# Patient Record
Sex: FEMALE | Race: WHITE | Employment: UNEMPLOYED | URBAN - METROPOLITAN AREA
[De-identification: names, ages, dates, MRNs, and addresses within clinical notes are randomized per-mention and may not be internally consistent; named-entity substitution may affect disease eponyms.]

---

## 2018-04-11 ENCOUNTER — OFFICE VISIT (OUTPATIENT)
Dept: ENDOCRINOLOGY | Age: 48
End: 2018-04-11

## 2018-04-11 VITALS
SYSTOLIC BLOOD PRESSURE: 97 MMHG | WEIGHT: 123 LBS | DIASTOLIC BLOOD PRESSURE: 66 MMHG | HEIGHT: 66 IN | OXYGEN SATURATION: 99 % | HEART RATE: 85 BPM | BODY MASS INDEX: 19.77 KG/M2

## 2018-04-11 DIAGNOSIS — E03.9 ACQUIRED HYPOTHYROIDISM: ICD-10-CM

## 2018-04-11 PROCEDURE — 99204 OFFICE O/P NEW MOD 45 MIN: CPT | Performed by: INTERNAL MEDICINE

## 2018-04-11 RX ORDER — OMEPRAZOLE 40 MG/1
40 CAPSULE, DELAYED RELEASE ORAL DAILY
COMMUNITY

## 2018-04-11 RX ORDER — HYDROCHLOROTHIAZIDE 12.5 MG/1
CAPSULE, GELATIN COATED ORAL
COMMUNITY
Start: 2018-02-27

## 2018-04-11 RX ORDER — THYROID,PORK 97.5 MG
97.5 TABLET ORAL
Qty: 30 TABLET | Refills: 1
Start: 2018-04-11 | End: 2018-04-11 | Stop reason: SDUPTHER

## 2018-04-11 RX ORDER — MINOCYCLINE HYDROCHLORIDE 50 MG/1
CAPSULE ORAL
COMMUNITY
Start: 2018-01-31 | End: 2019-04-08 | Stop reason: ALTCHOICE

## 2018-04-11 RX ORDER — TOPIRAMATE 50 MG/1
150 TABLET, FILM COATED ORAL
COMMUNITY

## 2018-04-11 RX ORDER — SPIRONOLACTONE 100 MG/1
100 TABLET, FILM COATED ORAL 2 TIMES DAILY
COMMUNITY

## 2018-04-11 RX ORDER — THYROID,PORK 97.5 MG
97.5 TABLET ORAL
Qty: 30 TABLET | Refills: 3 | Status: SHIPPED | OUTPATIENT
Start: 2018-04-11 | End: 2018-08-20 | Stop reason: SDUPTHER

## 2018-04-11 ASSESSMENT — PATIENT HEALTH QUESTIONNAIRE - PHQ9
2. FEELING DOWN, DEPRESSED OR HOPELESS: 0
SUM OF ALL RESPONSES TO PHQ QUESTIONS 1-9: 0
1. LITTLE INTEREST OR PLEASURE IN DOING THINGS: 0
SUM OF ALL RESPONSES TO PHQ9 QUESTIONS 1 & 2: 0

## 2018-05-23 ENCOUNTER — TELEPHONE (OUTPATIENT)
Dept: ENDOCRINOLOGY | Age: 48
End: 2018-05-23

## 2018-06-12 ENCOUNTER — OFFICE VISIT (OUTPATIENT)
Dept: ENDOCRINOLOGY | Age: 48
End: 2018-06-12

## 2018-06-12 VITALS
HEART RATE: 81 BPM | WEIGHT: 124.4 LBS | HEIGHT: 66 IN | OXYGEN SATURATION: 98 % | DIASTOLIC BLOOD PRESSURE: 68 MMHG | SYSTOLIC BLOOD PRESSURE: 104 MMHG | BODY MASS INDEX: 19.99 KG/M2

## 2018-06-12 DIAGNOSIS — E03.9 ACQUIRED HYPOTHYROIDISM: Primary | ICD-10-CM

## 2018-06-12 PROCEDURE — G8420 CALC BMI NORM PARAMETERS: HCPCS | Performed by: INTERNAL MEDICINE

## 2018-06-12 PROCEDURE — 1036F TOBACCO NON-USER: CPT | Performed by: INTERNAL MEDICINE

## 2018-06-12 PROCEDURE — G8427 DOCREV CUR MEDS BY ELIG CLIN: HCPCS | Performed by: INTERNAL MEDICINE

## 2018-06-12 PROCEDURE — 99213 OFFICE O/P EST LOW 20 MIN: CPT | Performed by: INTERNAL MEDICINE

## 2018-06-12 RX ORDER — LEVOTHYROXINE SODIUM 112 UG/1
112 TABLET ORAL DAILY
Qty: 90 TABLET | Refills: 1 | Status: SHIPPED | OUTPATIENT
Start: 2018-06-12 | End: 2018-08-20 | Stop reason: SDUPTHER

## 2018-06-12 ASSESSMENT — PATIENT HEALTH QUESTIONNAIRE - PHQ9
SUM OF ALL RESPONSES TO PHQ QUESTIONS 1-9: 0
SUM OF ALL RESPONSES TO PHQ9 QUESTIONS 1 & 2: 0
2. FEELING DOWN, DEPRESSED OR HOPELESS: 0
1. LITTLE INTEREST OR PLEASURE IN DOING THINGS: 0

## 2018-08-16 ENCOUNTER — TELEPHONE (OUTPATIENT)
Dept: ENDOCRINOLOGY | Age: 48
End: 2018-08-16

## 2018-08-16 NOTE — TELEPHONE ENCOUNTER
LMOM for patient to have labs done before her appointment on Monday, and if she has already had the labs to call the office to let us know when and where they were done.

## 2018-08-20 ENCOUNTER — OFFICE VISIT (OUTPATIENT)
Dept: ENDOCRINOLOGY | Age: 48
End: 2018-08-20

## 2018-08-20 VITALS
SYSTOLIC BLOOD PRESSURE: 100 MMHG | DIASTOLIC BLOOD PRESSURE: 60 MMHG | HEART RATE: 85 BPM | WEIGHT: 121.8 LBS | HEIGHT: 66 IN | BODY MASS INDEX: 19.58 KG/M2 | OXYGEN SATURATION: 98 %

## 2018-08-20 DIAGNOSIS — E03.9 ACQUIRED HYPOTHYROIDISM: ICD-10-CM

## 2018-08-20 DIAGNOSIS — E03.9 ACQUIRED HYPOTHYROIDISM: Primary | ICD-10-CM

## 2018-08-20 PROCEDURE — 1036F TOBACCO NON-USER: CPT | Performed by: INTERNAL MEDICINE

## 2018-08-20 PROCEDURE — G8420 CALC BMI NORM PARAMETERS: HCPCS | Performed by: INTERNAL MEDICINE

## 2018-08-20 PROCEDURE — 99213 OFFICE O/P EST LOW 20 MIN: CPT | Performed by: INTERNAL MEDICINE

## 2018-08-20 PROCEDURE — G8427 DOCREV CUR MEDS BY ELIG CLIN: HCPCS | Performed by: INTERNAL MEDICINE

## 2018-08-20 RX ORDER — LIOTHYRONINE SODIUM 5 UG/1
5 TABLET ORAL DAILY
Qty: 30 TABLET | Refills: 3 | Status: SHIPPED | OUTPATIENT
Start: 2018-08-20 | End: 2018-08-20 | Stop reason: SDUPTHER

## 2018-08-20 RX ORDER — LEVOTHYROXINE SODIUM 88 UG/1
88 TABLET ORAL DAILY
Qty: 90 TABLET | Refills: 1 | Status: SHIPPED | OUTPATIENT
Start: 2018-08-20 | End: 2018-11-14 | Stop reason: SDUPTHER

## 2018-08-20 RX ORDER — LEVOTHYROXINE SODIUM 88 UG/1
88 TABLET ORAL DAILY
Qty: 30 TABLET | Refills: 3 | Status: SHIPPED | OUTPATIENT
Start: 2018-08-20 | End: 2018-08-20 | Stop reason: SDUPTHER

## 2018-08-20 RX ORDER — LIOTHYRONINE SODIUM 5 UG/1
5 TABLET ORAL DAILY
Qty: 90 TABLET | Refills: 1 | Status: SHIPPED | OUTPATIENT
Start: 2018-08-20 | End: 2018-11-14 | Stop reason: SDUPTHER

## 2018-08-20 RX ORDER — DROSPIRENONE AND ETHINYL ESTRADIOL 0.02-3(28)
KIT ORAL
COMMUNITY
Start: 2018-07-23

## 2018-08-20 ASSESSMENT — PATIENT HEALTH QUESTIONNAIRE - PHQ9
SUM OF ALL RESPONSES TO PHQ9 QUESTIONS 1 & 2: 0
1. LITTLE INTEREST OR PLEASURE IN DOING THINGS: 0
2. FEELING DOWN, DEPRESSED OR HOPELESS: 0
SUM OF ALL RESPONSES TO PHQ QUESTIONS 1-9: 0
SUM OF ALL RESPONSES TO PHQ QUESTIONS 1-9: 0

## 2018-08-20 NOTE — PROGRESS NOTES
muscle pain,has bone pain  Integument/Breast: has hair loss, noskin rashes, no skin lesions, has itching, has dry skin, no breast pain, no breast mass, has skin hives, has skin discoloration, no nipple discharge  Neurological: no numbness, has tingling, no weakness, no confusion, no headaches, has dizziness, no fainting, no tremors, no decrease in memory, no balance problems  Psychiatric: no anxiety, no depression, has insomnia  Hematologic/Lymphatic: no tendency for easy bleeding, no swollen lymph nodes, has tendency for easy bruising  Immunology: has seasonal allergies, no frequent infections, no frequent illnesses  Endocrine: has temperature intolerance, no hot flashes, no hand tremor    OBJECTIVE:   /60 (Site: Left Arm, Position: Sitting, Cuff Size: Small Adult)   Pulse 85   Ht 5' 6\" (1.676 m)   Wt 121 lb 12.8 oz (55.2 kg)   SpO2 98%   BMI 19.66 kg/m²   Wt Readings from Last 3 Encounters:   08/20/18 121 lb 12.8 oz (55.2 kg)   06/12/18 124 lb 6.4 oz (56.4 kg)   04/11/18 123 lb (55.8 kg)       Physical Exam:  Constitutional: no acute distress, well appearing, well nourished  Psychiatric: oriented to person, place and time, judgement, insight and normal, recent and remote memory and intact and mood, affect are normal  Skin: skin and subcutaneous tissue is normal without mass, normal turgor  Head and Face: examination of head and face revealed no abnormalities  Eyes: no lid or conjunctival swelling, no erythema or discharge, pupils are normal, equal, round, and reactive to light  Ears/Nose: external inspection of ears and nose revealed no abnormalities, hearing is grossly normal  Oropharynx/Mouth/Face: lips, tongue and gums are normal with no lesions, the voice quality was normal  Neck: neck is supple and symmetric, with midline trachea and no masses, thyroid is small  Lymphatics: normal cervical lymph nodes, normal supraclavicular nodes  Pulmonary: no increased work of breathing or signs of respiratory distress, lungs are clear to auscultation  Cardiovascular: normal heart rate and rhythm, normal S1 and S2, no murmurs and pedal pulses and 2+ bilaterally, No edema  Abdomen: abdomen is soft, non-tender with no masses  Musculoskeletal: normal gait and station, exam of the digits and nails are normal  Neurological: normal coordination, normal general cortical function    Lab Review:  No results found for: TSH  No results found for: FREET4      ASSESSMENT/PLAN:  1. Acquired hypothyroidism  Decrease Levothyroxine to 0.088 mg, add liothyronine 5 mcg.  - T4, Free; Future  - TSH without Reflex; Future  - T3, Free; Future  - T3, Reverse; Future  Consider to add liothyronine. Reviewed and/or ordered clinical lab results Yes  Reviewed and/or ordered radiology tests Yes   Reviewed and/or ordered other diagnostic tests No  Discussed test results with performing physician No  Independently reviewed image, tracing, or specimen No  Made a decision to obtain old records No  Reviewed old records Yes  Obtained history from other than patient No    Luisa Falcon was counseled regarding symptoms of thyroid diagnosis, side effects of medications, treatment options,labs, imaging, treatment targets and goals, levothyroxine, liothyronine. She understands instructions and counseling. Total visit time 15 min, >50 % was spent in counseling. Return in about 3 months (around 11/20/2018) for thyroid problems.

## 2018-08-24 ENCOUNTER — TELEPHONE (OUTPATIENT)
Dept: ENDOCRINOLOGY | Age: 48
End: 2018-08-24

## 2018-08-28 NOTE — TELEPHONE ENCOUNTER
Pt was returning Madeline/Dr. Fernandez's call. Pt thinks that she is leveling out and she wants to give it a few more days to see what happens. Pt wants to leave it how it is and she'll give us call back to decide if she still wants to increase the dose or not.

## 2018-09-06 ENCOUNTER — TELEPHONE (OUTPATIENT)
Dept: ENDOCRINOLOGY | Age: 48
End: 2018-09-06

## 2018-09-06 NOTE — TELEPHONE ENCOUNTER
Spoke with the patient and advised to stop the Cytomel and and 88 mcg levothyroxine. Patient was advised to restart the 112 mcg levothyroxine and if the swelling gets any worse to immediatly go to the emergency room.  Patient stated her understanding

## 2018-09-06 NOTE — TELEPHONE ENCOUNTER
Pt called said she has leg, face and hand swelling was taken from 112- 88 levothyroxin with T3 added and has extreme fatigue would like a call bck from Dr. Jefferson Villafana

## 2018-11-14 DIAGNOSIS — E03.9 ACQUIRED HYPOTHYROIDISM: ICD-10-CM

## 2018-11-14 RX ORDER — LEVOTHYROXINE SODIUM 88 UG/1
88 TABLET ORAL DAILY
Qty: 90 TABLET | Refills: 1 | Status: SHIPPED | OUTPATIENT
Start: 2018-11-14 | End: 2018-11-21 | Stop reason: DRUGHIGH

## 2018-11-14 RX ORDER — LIOTHYRONINE SODIUM 5 UG/1
5 TABLET ORAL DAILY
Qty: 90 TABLET | Refills: 1 | Status: SHIPPED | OUTPATIENT
Start: 2018-11-14 | End: 2018-11-21 | Stop reason: ALTCHOICE

## 2018-11-21 RX ORDER — LEVOTHYROXINE SODIUM 112 UG/1
112 TABLET ORAL DAILY
Qty: 30 TABLET | Refills: 3 | Status: SHIPPED | OUTPATIENT
Start: 2018-11-21 | End: 2019-01-03 | Stop reason: CLARIF

## 2018-11-21 NOTE — TELEPHONE ENCOUNTER
Ms. Beto Acuna called to let Dr. Katlin Jimenez  know when she went to  her RX for Levothyroxine  it was for the wrong dose. Dose was changed in Sept back to 112 mcg. Please send new RX with correct dose.

## 2019-01-01 ENCOUNTER — TELEPHONE (OUTPATIENT)
Dept: ENDOCRINOLOGY | Age: 49
End: 2019-01-01

## 2019-01-01 ENCOUNTER — CLINICAL DOCUMENTATION (OUTPATIENT)
Dept: ENDOCRINOLOGY | Age: 49
End: 2019-01-01

## 2019-01-01 DIAGNOSIS — E87.5 HYPERKALEMIA: Primary | ICD-10-CM

## 2019-01-02 ENCOUNTER — TELEPHONE (OUTPATIENT)
Dept: ENDOCRINOLOGY | Age: 49
End: 2019-01-02

## 2019-01-02 PROBLEM — E87.5 HYPERKALEMIA: Status: ACTIVE | Noted: 2019-01-02

## 2019-01-03 ENCOUNTER — OFFICE VISIT (OUTPATIENT)
Dept: ENDOCRINOLOGY | Age: 49
End: 2019-01-03
Payer: COMMERCIAL

## 2019-01-03 VITALS
DIASTOLIC BLOOD PRESSURE: 84 MMHG | SYSTOLIC BLOOD PRESSURE: 122 MMHG | BODY MASS INDEX: 19.8 KG/M2 | HEIGHT: 66 IN | WEIGHT: 123.2 LBS

## 2019-01-03 DIAGNOSIS — E03.9 ACQUIRED HYPOTHYROIDISM: Primary | ICD-10-CM

## 2019-01-03 DIAGNOSIS — E87.5 HYPERKALEMIA: ICD-10-CM

## 2019-01-03 DIAGNOSIS — R73.01 IFG (IMPAIRED FASTING GLUCOSE): ICD-10-CM

## 2019-01-03 PROCEDURE — G8484 FLU IMMUNIZE NO ADMIN: HCPCS | Performed by: INTERNAL MEDICINE

## 2019-01-03 PROCEDURE — G8427 DOCREV CUR MEDS BY ELIG CLIN: HCPCS | Performed by: INTERNAL MEDICINE

## 2019-01-03 PROCEDURE — 1036F TOBACCO NON-USER: CPT | Performed by: INTERNAL MEDICINE

## 2019-01-03 PROCEDURE — G8420 CALC BMI NORM PARAMETERS: HCPCS | Performed by: INTERNAL MEDICINE

## 2019-01-03 PROCEDURE — 99214 OFFICE O/P EST MOD 30 MIN: CPT | Performed by: INTERNAL MEDICINE

## 2019-01-03 RX ORDER — LEVOTHYROXINE SODIUM 112 UG/1
112 TABLET ORAL DAILY
COMMUNITY
End: 2019-01-03 | Stop reason: SDUPTHER

## 2019-01-03 RX ORDER — LEVOTHYROXINE SODIUM 112 MCG
112 TABLET ORAL DAILY
Qty: 30 TABLET | Refills: 3 | Status: SHIPPED | OUTPATIENT
Start: 2019-01-03 | End: 2019-04-08 | Stop reason: SDUPTHER

## 2019-01-03 RX ORDER — LEVOTHYROXINE SODIUM 112 UG/1
112 TABLET ORAL DAILY
Qty: 30 TABLET | Refills: 3 | Status: CANCELLED | OUTPATIENT
Start: 2019-01-03

## 2019-04-07 NOTE — PROGRESS NOTES
Sexual activity: Yes   Lifestyle    Physical activity:     Days per week: None     Minutes per session: None    Stress: None   Relationships    Social connections:     Talks on phone: None     Gets together: None     Attends [employer] orth[employer] service: None     Active member of club or organization: None     Attends meetings of clubs or organizations: None     Relationship status: None    Intimate partner violence:     Fear of current or ex partner: None     Emotionally abused: None     Physically abused: None     Forced sexual activity: None   Other Topics Concern    None   Social History Narrative    None     Current Outpatient Medications   Medication Sig Dispense Refill    SYNTHROID 112 MCG tablet Take 1 tablet by mouth daily 30 tablet 5    drospirenone-ethinyl estradiol (SHAREE) 3-0.02 MG per tablet       topiramate (TOPAMAX) 50 MG tablet Take 150 mg by mouth      spironolactone (ALDACTONE) 100 MG tablet Take 100 mg by mouth 2 times daily       hydrochlorothiazide (MICROZIDE) 12.5 MG capsule TK 1 C PO TWICE DAILY PRN FOR WATER RETENTION      metFORMIN (GLUCOPHAGE) 500 MG tablet Take 500 mg by mouth      omeprazole (PRILOSEC) 40 MG delayed release capsule Take 40 mg by mouth 2 times daily       TIZANIDINE HCL PO Take 8 mg by mouth        No current facility-administered medications for this visit. Allergies   Allergen Reactions    Adhesive Tape Rash     ALL ADHESIVES; Liquid adhesive to close wounds. Gets a red, raised, itchy rash.     Betadine [Povidone Iodine] Hives    Duloxetine Hcl Other (See Comments)    Hydrocodone-Acetaminophen     Other     Sulfa Antibiotics Hives     Family Status   Relation Name Status    Mother      Father     [facility] Son  Alive       Review of Systems:  Constitutional: has fatigue, no fever, no recent weight gain, no recent weight loss, has changes in appetite  Eyes: no eye pain, has change in vision, has eye redness, has eye irritation, no double vision  Ears, nose, throat: no nasal congestion, no sore throat, no earache, no decrease in hearing, has hoarseness, no dry mouth, has sinus problems, has difficulty swallowing, no neck lumps, no dental problems, no mouth sores, has ringing in ears  Pulmonary: no shortness of breath, no wheezing, no cough  Cardiovascular: no chest pain, has lower extremity edema, no orthopnea, no intermittent leg claudication, no palpitations  Gastrointestinal: has abdominal pain, has nausea, no vomiting, has diarrhea, has constipation, has heartburn, has bloating  Genitourinary: has dysuria, no urinary incontinence, has urinary hesitancy, has change in urinary frequency, has feelings of urinary urgency, has nocturia  Musculoskeletal: has joint swelling, has joint stiffness, has joint pain, has muscle cramps, hsa muscle pain,has bone pain  Integument/Breast: has hair loss, noskin rashes, no skin lesions, has itching, has dry skin  Neurological: no numbness, has tingling, no weakness, no confusion, no headaches, has dizziness, no fainting, no tremors, no decrease in memory, no balance problems  Psychiatric: no anxiety, no depression, has insomnia  Hematologic/Lymphatic: no tendency for easy bleeding, no swollen lymph nodes, has tendency for easy bruising  Immunology: has seasonal allergies, no frequent infections, no frequent illnesses  Endocrine: has temperature intolerance, no hot flashes, no hand tremor    OBJECTIVE:   /76 (Site: Left Upper Arm, Position: Sitting, Cuff Size: Medium Adult)   Pulse 88   Ht 5' 6\" (1.676 m)   Wt 120 lb 6.4 oz (54.6 kg)   BMI 19.43 kg/m²   Wt Readings from Last 3 Encounters:   04/08/19 120 lb 6.4 oz (54.6 kg)   01/03/19 123 lb 3.2 oz (55.9 kg)   08/20/18 121 lb 12.8 oz (55.2 kg)       Physical Exam:  Constitutional: no acute distress, well appearing, well nourished  Psychiatric: oriented to person, place and time, judgement, insight and normal, recent and remote memory and intact and mood, affect are normal  Skin: skin and subcutaneous tissue is normal without mass, normal turgor  Head and Face: examination of head and face revealed no abnormalities  Eyes: no lid or conjunctival swelling, no erythema or discharge, pupils are normal, equal, round, and reactive to light  Ears/Nose: external inspection of ears and nose revealed no abnormalities, hearing is grossly normal  Oropharynx/Mouth/Face: lips, tongue and gums are normal with no lesions, the voice quality was normal  Neck: neck is supple and symmetric, with midline trachea and no masses, thyroid is small  Lymphatics: normal cervical lymph nodes, normal supraclavicular nodes  Pulmonary: no increased work of breathing or signs of respiratory distress, lungs are clear to auscultation  Cardiovascular: normal heart rate and rhythm, normal S1 and S2, no murmurs and pedal pulses and 2+ bilaterally, No edema  Abdomen: abdomen is soft, non-tender with no masses  Musculoskeletal: normal gait and station, exam of the digits and nails are normal  Neurological: normal coordination, normal general cortical function    Lab Review:  No results found for: TSH  No results found for: FREET4      ASSESSMENT/PLAN:  1. Acquired hypothyroidism  Continue Synthroid 0.112 mg qd. TSH 1.7.  - T4, Free; Future  - TSH without Reflex; Future  - T3, Free; Future  - T3, Reverse; Future    2. Hyperkalemia  Repeated potassium 4.6.-3.8  Follow CMP. 3. IFG   Hemoglobin A1c 4.9. Glucose 80. Diet, exercise.     Reviewed and/or ordered clinical lab results Yes  Reviewed and/or ordered radiology tests Yes   Reviewed and/or ordered other diagnostic tests No  Discussed test results with performing physician No  Independently reviewed image, tracing, or specimen No  Made a decision to obtain old records No  Reviewed old records Yes  Obtained history from other than patient No    Katerina Talon was counseled regarding symptoms of thyroid diagnosis, side effects of medications, treatment

## 2019-04-08 ENCOUNTER — OFFICE VISIT (OUTPATIENT)
Dept: ENDOCRINOLOGY | Age: 49
End: 2019-04-08
Payer: COMMERCIAL

## 2019-04-08 VITALS
SYSTOLIC BLOOD PRESSURE: 106 MMHG | HEIGHT: 66 IN | BODY MASS INDEX: 19.35 KG/M2 | WEIGHT: 120.4 LBS | DIASTOLIC BLOOD PRESSURE: 76 MMHG | HEART RATE: 88 BPM

## 2019-04-08 DIAGNOSIS — E87.5 HYPERKALEMIA: ICD-10-CM

## 2019-04-08 DIAGNOSIS — R73.01 IFG (IMPAIRED FASTING GLUCOSE): ICD-10-CM

## 2019-04-08 DIAGNOSIS — E03.9 ACQUIRED HYPOTHYROIDISM: Primary | ICD-10-CM

## 2019-04-08 PROCEDURE — 99214 OFFICE O/P EST MOD 30 MIN: CPT | Performed by: INTERNAL MEDICINE

## 2019-04-08 PROCEDURE — G8427 DOCREV CUR MEDS BY ELIG CLIN: HCPCS | Performed by: INTERNAL MEDICINE

## 2019-04-08 PROCEDURE — G8420 CALC BMI NORM PARAMETERS: HCPCS | Performed by: INTERNAL MEDICINE

## 2019-04-08 PROCEDURE — 1036F TOBACCO NON-USER: CPT | Performed by: INTERNAL MEDICINE

## 2019-04-08 RX ORDER — LEVOTHYROXINE SODIUM 112 MCG
112 TABLET ORAL DAILY
Qty: 30 TABLET | Refills: 5 | Status: SHIPPED | OUTPATIENT
Start: 2019-04-08 | End: 2019-05-20 | Stop reason: SDUPTHER

## 2019-05-03 ENCOUNTER — TELEPHONE (OUTPATIENT)
Dept: ENDOCRINOLOGY | Age: 49
End: 2019-05-03

## 2019-05-03 NOTE — TELEPHONE ENCOUNTER
PT not feeling well, hair falling out would like a call back from nurse, thinks her dose needs to be tweaked.

## 2019-05-07 NOTE — TELEPHONE ENCOUNTER
I reviewed the chart. The last TSH was done in February and was normal 1.7. I recommend to obtain a TSH FT3 and FT4 now and then call to see if it is abnormal.  Also, consider option of Effie Thyroid if she did not try before. It has side effects that insomnia, palpitations, anxiety, but some people feel better on it. If she decides to change to Effie Thyroid, then will need follow-up appointment in 2-3 months.

## 2019-05-08 NOTE — TELEPHONE ENCOUNTER
Pt has been informed of Dr. Shey Taylor note. Pt would like her orders emailed and she will complete the labs. Informed pt, when we receive the labs she'll get a call back with the results. Pt understood. Pt states that she has taking armour thyroid in the past and said that she has felt better on synthroid.

## 2019-05-17 ENCOUNTER — TELEPHONE (OUTPATIENT)
Dept: ENDOCRINOLOGY | Age: 49
End: 2019-05-17

## 2019-05-17 DIAGNOSIS — E03.9 ACQUIRED HYPOTHYROIDISM: Primary | ICD-10-CM

## 2019-05-18 NOTE — TELEPHONE ENCOUNTER
TSH 1.7. Synthroid 0.112 mg daily. Patient called because she was not feeling good. Repeated lab tests showed TSH 0.248. That means that her Synthroid dose is too high. However, patient feels that she is more hypothyroid. Because there is discrepancy on the same dose, please ask if she has any new medications or supplements. I recommend to decrease Synthroid by a half tablet one day a week, schedule sooner appointment and labs in 6 weeks. Let me know what questions patient has.

## 2019-05-20 RX ORDER — LEVOTHYROXINE SODIUM 112 MCG
TABLET ORAL
Qty: 30 TABLET | Refills: 5
Start: 2019-05-20 | End: 2019-09-03 | Stop reason: SDUPTHER

## 2019-05-20 NOTE — TELEPHONE ENCOUNTER
Patient notified of results and medication change. Patient was schedule for follow up appointment in 6 weeks. No new medications or supplements. Patient requested lab orders be mailed to her home. patient

## 2019-09-03 ENCOUNTER — OFFICE VISIT (OUTPATIENT)
Dept: ENDOCRINOLOGY | Age: 49
End: 2019-09-03
Payer: COMMERCIAL

## 2019-09-03 VITALS
SYSTOLIC BLOOD PRESSURE: 98 MMHG | OXYGEN SATURATION: 96 % | HEIGHT: 66 IN | HEART RATE: 90 BPM | WEIGHT: 119.2 LBS | DIASTOLIC BLOOD PRESSURE: 67 MMHG | BODY MASS INDEX: 19.16 KG/M2

## 2019-09-03 DIAGNOSIS — R73.01 IFG (IMPAIRED FASTING GLUCOSE): ICD-10-CM

## 2019-09-03 DIAGNOSIS — E03.9 ACQUIRED HYPOTHYROIDISM: Primary | ICD-10-CM

## 2019-09-03 DIAGNOSIS — E87.5 HYPERKALEMIA: ICD-10-CM

## 2019-09-03 PROCEDURE — G8427 DOCREV CUR MEDS BY ELIG CLIN: HCPCS | Performed by: INTERNAL MEDICINE

## 2019-09-03 PROCEDURE — 1036F TOBACCO NON-USER: CPT | Performed by: INTERNAL MEDICINE

## 2019-09-03 PROCEDURE — 99213 OFFICE O/P EST LOW 20 MIN: CPT | Performed by: INTERNAL MEDICINE

## 2019-09-03 PROCEDURE — G8420 CALC BMI NORM PARAMETERS: HCPCS | Performed by: INTERNAL MEDICINE

## 2019-09-03 RX ORDER — LEVOTHYROXINE SODIUM 112 MCG
TABLET ORAL
Qty: 30 TABLET | Refills: 11 | Status: SHIPPED | OUTPATIENT
Start: 2019-09-03 | End: 2020-05-20

## 2019-09-03 NOTE — PROGRESS NOTES
and remote memory and intact and mood, affect are normal  Skin: skin and subcutaneous tissue is normal without mass, normal turgor  Head and Face: examination of head and face revealed no abnormalities  Eyes: no lid or conjunctival swelling, no erythema or discharge, pupils are normal, equal, round, and reactive to light  Ears/Nose: external inspection of ears and nose revealed no abnormalities, hearing is grossly normal  Oropharynx/Mouth/Face: lips, tongue and gums are normal with no lesions, the voice quality was normal  Neck: neck is supple and symmetric, with midline trachea and no masses, thyroid is small  Lymphatics: normal cervical lymph nodes, normal supraclavicular nodes  Pulmonary: no increased work of breathing or signs of respiratory distress, lungs are clear to auscultation  Cardiovascular: normal heart rate and rhythm, normal S1 and S2, no murmurs and pedal pulses and 2+ bilaterally, No edema  Abdomen: abdomen is soft, non-tender with no masses  Musculoskeletal: normal gait and station, exam of the digits and nails are normal  Neurological: normal coordination, normal general cortical function    Lab Review:  No results found for: TSH  No results found for: FREET4      ASSESSMENT/PLAN:  1. Acquired hypothyroidism  Continue Synthroid 0.112 mg qd. TSH 1.7-0.63  - T4, Free; Future  - TSH without Reflex; Future  - T3, Free; Future  - T3, Reverse; Future    2. Hyperkalemia  Repeated potassium 4.6.-3.8-4.0  Follow CMP. Now normal    3. IFG   Hemoglobin A1c 4.9. Glucose 80-79  Diet, exercise.     Reviewed and/or ordered clinical lab results Yes  Reviewed and/or ordered radiology tests Yes   Reviewed and/or ordered other diagnostic tests No  Discussed test results with performing physician No  Independently reviewed image, tracing, or specimen No  Made a decision to obtain old records No  Reviewed old records Yes  Obtained history from other than patient No    Chito Harris was counseled regarding symptoms of

## 2020-05-14 ENCOUNTER — TELEPHONE (OUTPATIENT)
Dept: ENDOCRINOLOGY | Age: 50
End: 2020-05-14

## 2020-05-14 NOTE — TELEPHONE ENCOUNTER
Pt is wanting to know if she can do her labs now because she is not feeling well. Pt was last seen 9/3/19. I tried to schedule an appointment for her to come in and the patient refused to be seen. Pt wants labs done and medication adjustment done over the phone. Pt stated that she is only seen once a year.

## 2020-05-20 ENCOUNTER — VIRTUAL VISIT (OUTPATIENT)
Dept: ENDOCRINOLOGY | Age: 50
End: 2020-05-20
Payer: COMMERCIAL

## 2020-05-20 PROCEDURE — 99214 OFFICE O/P EST MOD 30 MIN: CPT | Performed by: INTERNAL MEDICINE

## 2020-05-20 PROCEDURE — G8427 DOCREV CUR MEDS BY ELIG CLIN: HCPCS | Performed by: INTERNAL MEDICINE

## 2020-05-20 RX ORDER — LEVOTHYROXINE SODIUM 112 MCG
TABLET ORAL
Qty: 30 TABLET | Refills: 5 | Status: SHIPPED | OUTPATIENT
Start: 2020-05-20 | End: 2020-09-22

## 2020-05-20 RX ORDER — ONDANSETRON 4 MG/1
4 TABLET, FILM COATED ORAL EVERY 8 HOURS PRN
COMMUNITY
Start: 2020-04-16

## 2020-05-20 NOTE — PROGRESS NOTES
OBJECTIVE:  Constitutional: no apparent distress, well developed and well nourished  Mental status: alert and awake, oriented to person, place and time, able to follow commands  Psychiatric: judgement and insight and normal, recent and remote memory are intact, mood and affect are normal  Skin: skin inspection appears normal, no significant exanthematous lesions or discoloration noted on facial skin  Head and Face: head and face inspection revealed no abnormalities, normocephalic, atraumatic  Eyes: no lid or conjunctival swelling, erythema or discharge, sclera appears normal  Ears/Nose: external inspection of ears and nose revealed no abnormalities, hearing is grossly normal  Oropharynx/Mouth/Face: lips are normal with no lesions, the voice quality was normal  Neck: neck is symmetric, no visualized mass  Pulmonary/chest: respiratory effort normal, no generalized signs of difficulty breathing or signs of respiratory distress  Musculoskeletal: normal station, normal range of motion of neck  Neurological: no facial asymmetry, normal general cortical function    Lab Review:  Lab Results   Component Value Date    TSH 1.350 05/15/2020     No results found for: FREET4      ASSESSMENT/PLAN:  1. Acquired hypothyroidism  Continue Synthroid 0.112 mg qd. TSH 1.7-0.63-1.35  - T4, Free; Future  - TSH without Reflex; Future  - T3, Free; Future    2. Hyperkalemia  Repeated potassium 4.6.-3.8-4.0-5.0  Follow CMP. Now normal    3. IFG   Hemoglobin A1c 4.9-4.8. Glucose 80-79-59  Diet, exercise.     Reviewed and/or ordered clinical lab results Yes  Reviewed and/or ordered radiology tests Yes   Reviewed and/or ordered other diagnostic tests No  Discussed test results with performing physician No  Independently reviewed image, tracing, or specimen No  Made a decision to obtain old records No  Reviewed old records Yes  Obtained history from other than patient No    Saulo Dailey was counseled regarding symptoms of thyroid Isrrael Vo MD and patient Orlando Marquezwright. Provider was located at her office. Patient was located at home. --Yesenia Richards MD on 5/20/2020 at 8:00 PM    An electronic signature was used to authenticate this note. Return in about 6 months (around 11/20/2020) for thyroid problems.

## 2020-08-04 ENCOUNTER — TELEPHONE (OUTPATIENT)
Dept: ENDOCRINOLOGY | Age: 50
End: 2020-08-04

## 2020-08-04 NOTE — TELEPHONE ENCOUNTER
Spoke with patient. Discussed her symptoms and low blood glucose levels. Patient is currently on metformin. Discussed that unlikely metformin is causing hypoglycemia. Patient also is following a low-carb diet. Please place CGM maria c Pro to evaluate glycemic patterns. I also ordered labs, schedule appointment for about 3- 4 weeks. Keep November appointment as well.

## 2020-08-05 ENCOUNTER — TELEPHONE (OUTPATIENT)
Dept: ENDOCRINOLOGY | Age: 50
End: 2020-08-05

## 2020-08-05 NOTE — TELEPHONE ENCOUNTER
PT states she needs a call to give her the CPT code for her [facility].  She has to pay 50% of it out of pocket and needs to give the code to her billing so she can find out how much it costs

## 2020-08-10 ENCOUNTER — NURSE ONLY (OUTPATIENT)
Dept: ENDOCRINOLOGY | Age: 50
End: 2020-08-10
Payer: COMMERCIAL

## 2020-08-10 PROCEDURE — 95250 CONT GLUC MNTR PHYS/QHP EQP: CPT | Performed by: INTERNAL MEDICINE

## 2020-09-22 ENCOUNTER — OFFICE VISIT (OUTPATIENT)
Dept: ENDOCRINOLOGY | Age: 50
End: 2020-09-22
Payer: COMMERCIAL

## 2020-09-22 VITALS
HEIGHT: 66 IN | BODY MASS INDEX: 19.29 KG/M2 | OXYGEN SATURATION: 100 % | SYSTOLIC BLOOD PRESSURE: 112 MMHG | WEIGHT: 120 LBS | DIASTOLIC BLOOD PRESSURE: 64 MMHG | TEMPERATURE: 97.7 F | HEART RATE: 92 BPM

## 2020-09-22 PROCEDURE — 1036F TOBACCO NON-USER: CPT | Performed by: INTERNAL MEDICINE

## 2020-09-22 PROCEDURE — G8427 DOCREV CUR MEDS BY ELIG CLIN: HCPCS | Performed by: INTERNAL MEDICINE

## 2020-09-22 PROCEDURE — G8420 CALC BMI NORM PARAMETERS: HCPCS | Performed by: INTERNAL MEDICINE

## 2020-09-22 PROCEDURE — 99214 OFFICE O/P EST MOD 30 MIN: CPT | Performed by: INTERNAL MEDICINE

## 2020-09-22 PROCEDURE — 95251 CONT GLUC MNTR ANALYSIS I&R: CPT | Performed by: INTERNAL MEDICINE

## 2020-09-22 RX ORDER — LEVOTHYROXINE SODIUM 112 MCG
TABLET ORAL
Qty: 30 TABLET | Refills: 11 | Status: SHIPPED
Start: 2020-09-22 | End: 2021-05-13 | Stop reason: DRUGHIGH

## 2020-09-22 NOTE — PROGRESS NOTES
SUBJECTIVE:  Tommy Johnson is a 52 y.o. female who is here for hypothyroidism. 1. Acquired hypothyroidism     This started in 2008. Patient was diagnosed with hypothyroidism. The problem has been unchanged. Patient started medication in 2008. Currently patient is on: levothyroxine. Misses  0 doses a month. Tried levothyroxine for 3 months, had heat feeling.     Current complaints: fatigue, dry skin, dry eyes, hair loss, weight gain. Edema fluctuating. Fatigue is not better. Severe.     History of obstructive symptoms: difficulty swallowing Yes, changes in voice/hoarseness Yes. Had 7 cervical fusions. Has pain. Has severe hair loss    History of radiation to patient's neck: No  Resent iodine exposure: No  Family history includes hypothyroidism. Family history of thyroid cancer: No     2. Hyperkalemia  No palpitations or chest pain.     3. IFG   Glucose 101-108-79  Stopped Metformin, fluctuating levels of BG when off metformin. Result Impression   IMPRESSION:    Small thyroid gland without focal lesion. Workstation [id number]   Result Narrative     THYROID ULTRASOUND: 5/17/2018    INDICATION: Hypothyroidism    COMPARISON:  No direct comparison, reference to CT neck July 2015    TECHNIQUE: Ultrasonographic evaluation of the thyroid    FINDINGS:      RIGHT LOBE: 2.9 x 0.8 x 1 cm.  Homogenous echogenicity without discrete nodule. LEFT LOBE:   2.8 x 0.6 x 1 cm.   Homogenous echogenicity without discrete nodule. Isthmus: Unremarkable. Cervical lymph nodes: None identified. Status            IMPRESSION:    Postoperative findings of C3-C7 fusion with no definite compressive abnormality. The C7 screws project into the C7-T1 disc space and there is a slight posterior protrusion of the left C5 screw. Report Verified by: Rey Wagoner M.D. at 3/13/2018 5:00 PM EDT   Result Narrative   EXAM: CT CERVICAL SPINE WO CONTRAST .      INDICATION:  Spinal Stenosis;     TECHNIQUE: CT CERVICAL SPINE WO CONTRAST was performed with axial thin section images. Sagittal and coronal 2D multiplanar reconstructions were performed at the scanner. FINDINGS:    Sagittal reconstructions show normal AP alignment of the cervical spine. Postoperative findings of C3-C7 fusion are present with well incorporated appearing interbody grafts and evidence of previous corpectomies at C4 and C5. The C7 screws project into the C7-T1 disc space. There is no lucency around the screws or other hardware. There is no definite compressive abnormality of the cervical spinal canal. Residual vertebral body hypertrophy and slight left C5 posterior screw protrusion causes minimal effacement of ventral subarachnoid space with no definite cord compression. Cord morphology is poorly visualized due to streak artifacts. There is no evidence of neck mass or adenopathy on this examination. Findings of previous thyroidectomy are present.          Past Medical History:   Diagnosis Date    Degenerative disc disease, thoracic     Amber-Danlos syndrome 2016    GERD (gastroesophageal reflux disease)     IBS (irritable bowel syndrome)     PCOS (polycystic ovarian syndrome)     Spinal stenosis 2009     Patient Active Problem List    Diagnosis Date Noted    IFG (impaired fasting glucose) 01/03/2019    Hyperkalemia 01/02/2019    Acquired hypothyroidism 04/11/2018     Past Surgical History:   Procedure Laterality Date    CERVICAL FUSION  5514-8447    6     Family History   Problem Relation Age of Onset    Cancer Father      Social History     Socioeconomic History    Marital status:      Spouse name: None    Number of children: None    Years of education: None    Highest education level: None   Occupational History    None   Social Needs    Financial resource strain: None    Food insecurity     Worry: None     Inability: None    Transportation needs     Medical: None     Non-medical: None   Tobacco Use    Smoking status: Never Smoker    Smokeless tobacco: Never Used   Substance and Sexual Activity    Alcohol use: Yes    Drug use: No    Sexual activity: Yes   Lifestyle    Physical activity     Days per week: None     Minutes per session: None    Stress: None   Relationships    Social connections     Talks on phone: None     Gets together: None     Attends [employer] service: None     Active member of club or organization: None     Attends meetings of clubs or organizations: None     Relationship status: None    Intimate partner violence     Fear of current or ex partner: None     Emotionally abused: None     Physically abused: None     Forced sexual activity: None   Other Topics Concern    None   Social History Narrative    None     Current Outpatient Medications   Medication Sig Dispense Refill    metFORMIN (GLUCOPHAGE) 500 MG tablet Take 1 tablet by mouth 2 times daily (with meals) 60 tablet 11    SYNTHROID 112 MCG tablet 1 tablet 6 days per week 30 tablet 11    ondansetron (ZOFRAN) 4 MG tablet Take 4 mg by mouth every 8 hours as needed      topiramate (TOPAMAX) 50 MG tablet Take 150 mg by mouth      spironolactone (ALDACTONE) 100 MG tablet Take 100 mg by mouth 2 times daily       omeprazole (PRILOSEC) 40 MG delayed release capsule Take 40 mg by mouth 2 times daily       TIZANIDINE HCL PO Take 8 mg by mouth       drospirenone-ethinyl estradiol (SHAREE) 3-0.02 MG per tablet       hydrochlorothiazide (MICROZIDE) 12.5 MG capsule TK 1 C PO TWICE DAILY PRN FOR WATER RETENTION       No current facility-administered medications for this visit. Allergies   Allergen Reactions    Adhesive Tape Rash     ALL ADHESIVES; Liquid adhesive to close wounds. Gets a red, raised, itchy rash.     Betadine [Povidone Iodine] Hives    Duloxetine Hcl Other (See Comments)    Hydrocodone-Acetaminophen     Other     Sulfa Antibiotics Hives     Family Status   Relation Name Status    Mother      Father     [facility] Son  Alive Exam:  Constitutional: no acute distress, well appearing, well nourished  Psychiatric: oriented to person, place and time, judgement, insight and normal, recent and remote memory and intact and mood, affect are normal  Skin: skin and subcutaneous tissue is normal without mass, normal turgor  Head and Face: examination of head and face revealed no abnormalities  Eyes: no lid or conjunctival swelling, no erythema or discharge, pupils are normal, equal, round, and reactive to light  Ears/Nose: external inspection of ears and nose revealed no abnormalities, hearing is grossly normal  Oropharynx/Mouth/Face: lips, tongue and gums are normal with no lesions, the voice quality was normal  Neck: neck is supple and symmetric, with midline trachea and no masses, thyroid is small  Lymphatics: normal cervical lymph nodes, normal supraclavicular nodes  Pulmonary: no increased work of breathing or signs of respiratory distress, lungs are clear to auscultation  Cardiovascular: normal heart rate and rhythm, normal S1 and S2, no murmurs and pedal pulses and 2+ bilaterally, No edema  Abdomen: abdomen is soft, non-tender with no masses  Musculoskeletal: normal gait and station, exam of the digits and nails are normal  Neurological: normal coordination, normal general cortical function    Lab Review:  Lab Results   Component Value Date    TSH 1.170 08/14/2020     No results found for: FREET4      ASSESSMENT/PLAN:  1. Acquired hypothyroidism  Continue Synthroid 0.112 mg qd. TSH 1.7-0.63-1.35-1.17  - T4, Free; Future  - TSH without Reflex; Future  - T3, Free; Future     2. Hyperkalemia  Repeated potassium 4.6.-3.8-4.0-5.0-4.1  Follow CMP. Now normal     3. IFG   Metformin 500 mg bid  Hemoglobin A1c 4.9-4.8  Glucose 80-79-59-89  Diet, exercise.   No premature CAD in family  Has strong family history of diabetes    Reviewed and/or ordered clinical lab results Yes  Reviewed and/or ordered radiology tests Yes   Reviewed and/or ordered other diagnostic tests No  Discussed test results with performing physician No  Independently reviewed image, tracing, or specimen No  Made a decision to obtain old records No  Reviewed old records Yes  Obtained history from other than patient No    Erlin Witt was counseled regarding symptoms of thyroid diagnosis, side effects of medications, treatment options,labs, imaging, treatment targets and goals, prediabetes, diabetes prevention, metformin. She understands instructions and counseling. Total visit time 25 min, >50% was spent in counseling    CGMS Download Review and Recommendations    "[employer] CGMS data downloaded and reviewed. See scanned attached tracing  This was separate service provided-interpretation of CGM data    Average glucose 89  ± 13.9 SD  Time in range: 98%  Time above 180: 0%  Time under 70: 2%   Glucose management indicator 5.4    Basal pattern review:  Basal patterns  Postprandial pattern review: No significant postprandial hyperglycemia  Hypoglycemia review: 10 PM to 12 noon  Activity related review: Not available      Based on the data, I recommend:    1. Continue current diet and exercise regimen regimen    2. Measure blood glucose if symptomatic    3. There was no hypoglycemia recorded below 54.    4.  Manage hypoglycemia with frequent meals including protein and healthy fats            Return in about 1 year (around 9/22/2021) for thyroid problems.

## 2020-11-19 ENCOUNTER — CLINICAL DOCUMENTATION (OUTPATIENT)
Dept: ENDOCRINOLOGY | Age: 50
End: 2020-11-19

## 2021-05-10 ENCOUNTER — TELEPHONE (OUTPATIENT)
Dept: ENDOCRINOLOGY | Age: 51
End: 2021-05-10

## 2021-05-10 NOTE — TELEPHONE ENCOUNTER
Spoke with patient and gave her the fax number to the [address] [place] [address] office to fax lab results.

## 2021-05-10 NOTE — TELEPHONE ENCOUNTER
Called [facility] and was informed that labs were done but labs had been ordered from another provider. LVM for patient to call ordering provider for results. Informed to call office if any questions at [phone].

## 2021-05-13 ENCOUNTER — TELEPHONE (OUTPATIENT)
Dept: ENDOCRINOLOGY | Age: 51
End: 2021-05-13

## 2021-05-13 RX ORDER — LIOTHYRONINE SODIUM 5 UG/1
2.5 TABLET ORAL DAILY
Qty: 30 TABLET | Refills: 2 | Status: SHIPPED | OUTPATIENT
Start: 2021-05-13 | End: 2021-10-12

## 2021-05-13 RX ORDER — LEVOTHYROXINE SODIUM 100 MCG
100 TABLET ORAL
Qty: 30 TABLET | Refills: 2 | Status: SHIPPED | OUTPATIENT
Start: 2021-05-13 | End: 2021-08-16 | Stop reason: ALTCHOICE

## 2021-05-14 NOTE — TELEPHONE ENCOUNTER
I reviewed patient's diary and lab results. TSH slightly suppressed. T3 on the low side. Patient is very concerned about she is so tired. We tried Cytomel 5 mcg and Synthroid 88 mcg before. Patient had symptoms of jitteriness. We will try Synthroid 100 mcg and liothyronine 2.5 mcg. Patient will notify how she feels. She has follow-up appointment.

## 2021-08-16 ENCOUNTER — VIRTUAL VISIT (OUTPATIENT)
Dept: ENDOCRINOLOGY | Age: 51
End: 2021-08-16
Payer: COMMERCIAL

## 2021-08-16 DIAGNOSIS — E87.5 HYPERKALEMIA: ICD-10-CM

## 2021-08-16 DIAGNOSIS — E03.9 ACQUIRED HYPOTHYROIDISM: Primary | ICD-10-CM

## 2021-08-16 DIAGNOSIS — R73.01 IFG (IMPAIRED FASTING GLUCOSE): ICD-10-CM

## 2021-08-16 PROCEDURE — G8427 DOCREV CUR MEDS BY ELIG CLIN: HCPCS | Performed by: INTERNAL MEDICINE

## 2021-08-16 PROCEDURE — 99214 OFFICE O/P EST MOD 30 MIN: CPT | Performed by: INTERNAL MEDICINE

## 2021-08-16 RX ORDER — ALBUTEROL SULFATE 90 UG/1
2 AEROSOL, METERED RESPIRATORY (INHALATION) EVERY 6 HOURS PRN
COMMUNITY
Start: 2021-07-28

## 2021-08-16 RX ORDER — MELOXICAM 15 MG/1
1 TABLET ORAL DAILY
COMMUNITY
Start: 2021-04-23

## 2021-08-16 RX ORDER — LEVOTHYROXINE SODIUM 75 MCG
75 TABLET ORAL
Qty: 30 TABLET | Refills: 3 | Status: SHIPPED | OUTPATIENT
Start: 2021-08-16 | End: 2021-11-16 | Stop reason: SDUPTHER

## 2021-08-16 RX ORDER — LEVOTHYROXINE SODIUM 100 MCG
100 TABLET ORAL
Qty: 30 TABLET | Refills: 2 | Status: CANCELLED | OUTPATIENT
Start: 2021-08-16

## 2021-08-16 RX ORDER — RIMEGEPANT SULFATE 75 MG/75MG
TABLET, ORALLY DISINTEGRATING ORAL
COMMUNITY

## 2021-08-16 RX ORDER — PROGESTERONE 100 MG/1
200 CAPSULE ORAL DAILY
COMMUNITY

## 2021-08-16 NOTE — PROGRESS NOTES
SUBJECTIVE:  Nhan Whitaker is a 54 y.o. female who is here for hypothyroidism. 2021    TELEHEALTH EVALUATION -- Audio/Visual (During JZXQG-68 public health emergency)    Patient provided verbal consent to use the video visit. HPI:    Nhan Whitaker (:  1970) has requested an audio/video evaluation for the following concern(s):        1. Acquired hypothyroidism     This started in . Patient was diagnosed with hypothyroidism. The problem has been unchanged. Patient started medication in . Currently patient is on: levothyroxine. Misses  0 doses a month. Tried levothyroxine for 3 months, had heat feeling.     Current complaints: fatigue, dry skin, dry eyes, hair loss, weight gain. Edema fluctuating. Fatigue is not better. Severe.     History of obstructive symptoms: difficulty swallowing Yes, changes in voice/hoarseness Yes. Had 7 cervical fusions. Has pain. Has severe hair loss    History of radiation to patient's neck: No  Resent iodine exposure: No  Family history includes hypothyroidism. Family history of thyroid cancer: No     2. Hyperkalemia  No palpitations or chest pain.     3. IFG   Glucose 101-108-79  Stopped Metformin, fluctuating levels of BG when off metformin. Result Impression   IMPRESSION:    Small thyroid gland without focal lesion. Workstation [id number]   Result Narrative     THYROID ULTRASOUND: 2018    INDICATION: Hypothyroidism    COMPARISON:  No direct comparison, reference to CT neck 2015    TECHNIQUE: Ultrasonographic evaluation of the thyroid    FINDINGS:      RIGHT LOBE: 2.9 x 0.8 x 1 cm.  Homogenous echogenicity without discrete nodule. LEFT LOBE:   2.8 x 0.6 x 1 cm.   Homogenous echogenicity without discrete nodule. Isthmus: Unremarkable. Cervical lymph nodes: None identified. Status            IMPRESSION:    Postoperative findings of C3-C7 fusion with no definite compressive abnormality.  The C7 screws project into the C7-T1 disc space and there is a slight posterior protrusion of the left C5 screw. Report Verified by: Lillian Sherwood M.D. at 3/13/2018 5:00 PM EDT   Result Narrative   EXAM: CT CERVICAL SPINE WO CONTRAST . INDICATION:  Spinal Stenosis;     TECHNIQUE: CT CERVICAL SPINE WO CONTRAST was performed with axial thin section images. Sagittal and coronal 2D multiplanar reconstructions were performed at the scanner. FINDINGS:    Sagittal reconstructions show normal AP alignment of the cervical spine. Postoperative findings of C3-C7 fusion are present with well incorporated appearing interbody grafts and evidence of previous corpectomies at C4 and C5. The C7 screws project into the C7-T1 disc space. There is no lucency around the screws or other hardware. There is no definite compressive abnormality of the cervical spinal canal. Residual vertebral body hypertrophy and slight left C5 posterior screw protrusion causes minimal effacement of ventral subarachnoid space with no definite cord compression. Cord morphology is poorly visualized due to streak artifacts. There is no evidence of neck mass or adenopathy on this examination. Findings of previous thyroidectomy are present.          Past Medical History:   Diagnosis Date    Degenerative disc disease, thoracic     Amber-Danlos syndrome 2016    GERD (gastroesophageal reflux disease)     IBS (irritable bowel syndrome)     PCOS (polycystic ovarian syndrome)     Spinal stenosis 2009     Patient Active Problem List    Diagnosis Date Noted    IFG (impaired fasting glucose) 01/03/2019    Hyperkalemia 01/02/2019    Acquired hypothyroidism 04/11/2018     Past Surgical History:   Procedure Laterality Date    CERVICAL FUSION  [id number]    6     Family History   Problem Relation Age of Onset    Cancer Father      Social History     Socioeconomic History    Marital status:      Spouse name: None    Number of children: None    Years of education: None    Highest education level: None   Occupational History    None   Tobacco Use    Smoking status: Never Smoker    Smokeless tobacco: Never Used   Vaping Use    Vaping Use: Never used   Substance and Sexual Activity    Alcohol use: Not Currently    Drug use: No    Sexual activity: Yes   Other Topics Concern    None   Social History Narrative    None     Social Determinants of Health     Financial Resource Strain:     Difficulty of Paying Living Expenses:    Food Insecurity:     Worried About Running Out of Food in the Last Year:     Ran Out of Food in the Last Year:    Transportation Needs:     Lack of Transportation (Medical):  Lack of Transportation (Non-Medical):    Physical Activity:     Days of Exercise per Week:     Minutes of Exercise per Session:    Stress:     Feeling of Stress :    Social Connections:     Frequency of Communication with Friends and Family:     Frequency of Social Gatherings with Friends and Family:     Attends Synagogue Services:     Active Member of Clubs or Organizations:     Attends Club or Organization Meetings:     Marital Status:    Intimate Partner Violence:     Fear of Current or Ex-Partner:     Emotionally Abused:     Physically Abused:     Sexually Abused:      Current Outpatient Medications   Medication Sig Dispense Refill    albuterol sulfate  (90 Base) MCG/ACT inhaler Inhale 2 puffs into the lungs every 6 hours as needed      meloxicam (MOBIC) 15 MG tablet Take 1 tablet by mouth daily      progesterone (PROMETRIUM) 100 MG CAPS capsule Take 1 capsule by mouth daily      Rimegepant Sulfate (NURTEC) 75 MG TBDP Nurtec ODT 75 mg disintegrating tablet   DISSOLVE 1 T PO AT ONSET OF MIGRAINE. MAY REPEAT IN 24 HOURS IF NEEDED.  MAX EIGHT TABLETS PER MONTH      SYNTHROID 75 MCG tablet Take 1 tablet by mouth every morning (before breakfast) 30 tablet 3    metFORMIN (GLUCOPHAGE) 500 MG tablet Take 1 tablet by mouth 2 times daily (with meals) 60 tablet 11    ondansetron (ZOFRAN) 4 MG tablet Take 4 mg by mouth every 8 hours as needed      topiramate (TOPAMAX) 50 MG tablet Take 150 mg by mouth      spironolactone (ALDACTONE) 100 MG tablet Take 100 mg by mouth 2 times daily       omeprazole (PRILOSEC) 40 MG delayed release capsule Take 40 mg by mouth 2 times daily       TIZANIDINE HCL PO Take 8 mg by mouth       liothyronine (CYTOMEL) 5 MCG tablet Take 0.5 tablets by mouth daily (Patient not taking: Reported on 2021) 30 tablet 2    drospirenone-ethinyl estradiol (SHAREE) 3-0.02 MG per tablet  (Patient not taking: Reported on 2021)      hydrochlorothiazide (MICROZIDE) 12.5 MG capsule TK 1 C PO TWICE DAILY PRN FOR WATER RETENTION (Patient not taking: Reported on 2021)       No current facility-administered medications for this visit. Allergies   Allergen Reactions    Adhesive Tape Rash     ALL ADHESIVES; Liquid adhesive to close wounds. Gets a red, raised, itchy rash.     Betadine [Povidone Iodine] Hives    Duloxetine Hcl Other (See Comments)    Hydrocodone-Acetaminophen     Other     Sulfa Antibiotics Hives     Family Status   Relation Name Status    Mother     Rizwana Cox Father     Rizwana Cox Son  Alive       Review of Systems:  Constitutional: has fatigue, no fever, no recent weight gain, no recent weight loss, has changes in appetite  Eyes: no eye pain, has change in vision, has eye redness, has eye irritation, no double vision  Ears, nose, throat: no nasal congestion, no sore throat, no earache, no decrease in hearing, has hoarseness, no dry mouth, has sinus problems, has difficulty swallowing, no neck lumps, no dental problems, no mouth sores, has ringing in ears  Pulmonary: no shortness of breath, no wheezing, no cough  Cardiovascular: no chest pain, has lower extremity edema, no orthopnea, no intermittent leg claudication, no palpitations  Gastrointestinal: has abdominal pain, has nausea, no vomiting, has diarrhea, has constipation, has heartburn, has bloating  Genitourinary: has dysuria, no urinary incontinence, has urinary hesitancy, has change in urinary frequency, has feelings of urinary urgency, has nocturia  Musculoskeletal: has joint swelling, has joint stiffness, has joint pain, has muscle cramps, hsa muscle pain,has bone pain  Integument/Breast: has hair loss, noskin rashes, no skin lesions, has itching, has dry skin  Neurological: no numbness, has tingling, no weakness, no confusion, no headaches, has dizziness, no fainting, no tremors, no decrease in memory, no balance problems  Psychiatric: no anxiety, no depression, has insomnia  Hematologic/Lymphatic: no tendency for easy bleeding, no swollen lymph nodes, has tendency for easy bruising  Immunology: has seasonal allergies, no frequent infections, no frequent illnesses  Endocrine: has temperature intolerance, no hot flashes, no hand tremor    OBJECTIVE:   There were no vitals taken for this visit.   Wt Readings from Last 3 Encounters:   09/22/20 120 lb (54.4 kg)   09/03/19 119 lb 3.2 oz (54.1 kg)   04/08/19 120 lb 6.4 oz (54.6 kg)       OBJECTIVE:  Constitutional: no apparent distress, well developed and well nourished  Mental status: alert and awake, oriented to person, place and time, able to follow commands  Psychiatric: judgement and insight and normal, recent and remote memory are intact, mood and affect are normal  Skin: skin inspection appears normal, no significant exanthematous lesions or discoloration noted on facial skin  Head and Face: head and face inspection revealed no abnormalities, normocephalic, atraumatic  Eyes: no lid or conjunctival swelling, erythema or discharge, sclera appears normal  Ears/Nose: external inspection of ears and nose revealed no abnormalities, hearing is grossly normal  Oropharynx/Mouth/Face: lips are normal with no lesions, the voice quality was normal  Neck: neck is symmetric, no visualized mass  Pulmonary/chest: respiratory effort normal, no generalized signs of difficulty breathing or signs of respiratory distress  Musculoskeletal: normal station, normal range of motion of neck  Neurological: no facial asymmetry, normal general cortical function      Lab Review:  Lab Results   Component Value Date    TSH 0.631 08/04/2021     No results found for: FREET4      ASSESSMENT/PLAN:  1. Acquired hypothyroidism  Has gluten sensitivity  Lost 5 lbs  Decrease Synthroid to 0.075 mg qd. Start Liothyronine 5 mcg daily  TSH 1.7-0.631.351.170.88-0.631  T3 low  - T4, Free; Future  - TSH without Reflex; Future  - T3, Free; Future     2. Hyperkalemia  Repeated potassium 4.6.-3.8-4.05.04.14.7  Follow CMP. Now normal     3. IFG   Metformin 500 mg bid  Hemoglobin A1c 4.94.85.1  Glucose 80-79598985  Diet, exercise. No premature CAD in family  Has strong family history of diabetes    Reviewed and/or ordered clinical lab results Yes  Reviewed and/or ordered radiology tests Yes   Reviewed and/or ordered other diagnostic tests No  Discussed test results with performing physician No  Independently reviewed image, tracing, or specimen No  Made a decision to obtain old records No  Reviewed old records Yes  Obtained history from other than patient No    Va Shah was counseled regarding symptoms of thyroid diagnosis, side effects of medications, treatment options,labs, imaging, treatment targets and goals, prediabetes, diabetes prevention, metformin. She understands instructions and counseling. Va Shah is a 54 y.o. female being evaluated by a Virtual Visit (video visit) encounter, including two-way audio and video communication, in lieu of an in-person visit due to coronavirus emergency, to address concerns as mentioned in history and assessment and plan. Patient identification was verified at the start of the visit. I conducted an interview, performed a limited exam by video and educated the patient on my assessment and plan.     Due to this being a TeleHealth encounter (During MXTRL-25 public health emergency), evaluation of the following organ systems was limited: Vitals/Constitutional/EENT/Resp/CV/GI//MS/Neuro/Skin/Heme-Lymph-Imm. Pursuant to the emergency declaration under the [facility], [address] [place] [address] and the [employer] Act, this Virtual Visit was conducted with patient's (and/or legal guardian's) consent, to reduce the patient's risk of exposure to COVID-19 and provide necessary medical care. The patient (and/or legal guardian) has also been advised to contact this office for worsening conditions or problems, and seek emergency medical treatment and/or call 911 if deemed necessary. Total time spent on this encounter via Telehealth (synchronous, real-time audio/visual connection): 30 min    See assessment, plan and counseling note for counseling and care coordination details. Services were provided through a video synchronous discussion virtually to substitute for in-person clinic visit. Persons participating in the telehealth service: provider - Beto Traylor MD and patient Kavitha Lee. Provider was located at her office. Patient was located at home. --Beto Traylor MD on 8/16/2021 at 11:58 AM    An electronic signature was used to authenticate this note. Return in about 3 months (around 11/16/2021) for thyroid problems.

## 2021-10-12 ENCOUNTER — TELEPHONE (OUTPATIENT)
Dept: ENDOCRINOLOGY | Age: 51
End: 2021-10-12

## 2021-10-12 DIAGNOSIS — E03.9 ACQUIRED HYPOTHYROIDISM: Primary | ICD-10-CM

## 2021-10-12 RX ORDER — LIOTHYRONINE SODIUM 5 UG/1
5 TABLET ORAL DAILY
Qty: 30 TABLET | Refills: 2 | Status: SHIPPED | OUTPATIENT
Start: 2021-10-12 | End: 2021-10-13 | Stop reason: SDUPTHER

## 2021-10-12 NOTE — TELEPHONE ENCOUNTER
The pharmacy on file was [facility], I sent prescription there. .  I tried [facility] as per patient request, but it did not come up with the address provided. If patient still needs it to be sent to [facility], please place the correct pharmacy. Scottie Brooks

## 2021-10-13 RX ORDER — LIOTHYRONINE SODIUM 5 UG/1
5 TABLET ORAL DAILY
Qty: 30 TABLET | Refills: 2 | Status: SHIPPED | OUTPATIENT
Start: 2021-10-13 | End: 2021-11-16 | Stop reason: SDUPTHER

## 2021-10-13 NOTE — TELEPHONE ENCOUNTER
PT called back Pharmacy is [address] [place] [address], [facility], [facility], [address] [facility] [facility] phone [phone]

## 2021-11-16 ENCOUNTER — VIRTUAL VISIT (OUTPATIENT)
Dept: ENDOCRINOLOGY | Age: 51
End: 2021-11-16
Payer: COMMERCIAL

## 2021-11-16 DIAGNOSIS — E03.9 ACQUIRED HYPOTHYROIDISM: Primary | ICD-10-CM

## 2021-11-16 DIAGNOSIS — R73.01 IFG (IMPAIRED FASTING GLUCOSE): ICD-10-CM

## 2021-11-16 DIAGNOSIS — E87.5 HYPERKALEMIA: ICD-10-CM

## 2021-11-16 PROCEDURE — G8427 DOCREV CUR MEDS BY ELIG CLIN: HCPCS | Performed by: INTERNAL MEDICINE

## 2021-11-16 PROCEDURE — 99214 OFFICE O/P EST MOD 30 MIN: CPT | Performed by: INTERNAL MEDICINE

## 2021-11-16 RX ORDER — PRUCALOPRIDE 2 MG/1
TABLET, FILM COATED ORAL
COMMUNITY
Start: 2021-11-12

## 2021-11-16 RX ORDER — LIOTHYRONINE SODIUM 5 UG/1
5 TABLET ORAL DAILY
Qty: 30 TABLET | Refills: 3 | Status: SHIPPED | OUTPATIENT
Start: 2021-11-16 | End: 2022-01-28

## 2021-11-16 RX ORDER — LEVOTHYROXINE SODIUM 75 MCG
75 TABLET ORAL
Qty: 30 TABLET | Refills: 3 | Status: SHIPPED | OUTPATIENT
Start: 2021-11-16 | End: 2022-04-11

## 2021-11-16 NOTE — PROGRESS NOTES
SUBJECTIVE:  Ash Wilson is a 54 y.o. female who is here for hypothyroidism. 2021    TELEHEALTH EVALUATION -- Audio/Visual (During ZQAGG-25 public health emergency)    Patient provided verbal consent to use the video visit. HPI:    Ash Wilson (:  1970) has requested an audio/video evaluation for the following concern(s):        1. Acquired hypothyroidism     This started in . Patient was diagnosed with hypothyroidism. The problem has been unchanged. Patient started medication in . Currently patient is on: levothyroxine. Misses  0 doses a month. Tried levothyroxine for 3 months, had heat feeling.     Current complaints: fatigue, dry skin, dry eyes, hair loss, weight gain. Edema fluctuating. Fatigue is not better. Severe.     History of obstructive symptoms: difficulty swallowing Yes, changes in voice/hoarseness Yes. Had 7 cervical fusions. Has pain. Has severe hair loss    History of radiation to patient's neck: No  Resent iodine exposure: No  Family history includes hypothyroidism. Family history of thyroid cancer: No     2. Hyperkalemia  No palpitations or chest pain.     3. IFG   Glucose 101-108-79  Stopped Metformin, fluctuating levels of BG when off metformin. Result Impression   IMPRESSION:    Small thyroid gland without focal lesion. Workstation [id number]   Result Narrative     THYROID ULTRASOUND: 2018    INDICATION: Hypothyroidism    COMPARISON:  No direct comparison, reference to CT neck 2015    TECHNIQUE: Ultrasonographic evaluation of the thyroid    FINDINGS:      RIGHT LOBE: 2.9 x 0.8 x 1 cm.  Homogenous echogenicity without discrete nodule. LEFT LOBE:   2.8 x 0.6 x 1 cm.   Homogenous echogenicity without discrete nodule. Isthmus: Unremarkable. Cervical lymph nodes: None identified. Status            IMPRESSION:    Postoperative findings of C3-C7 fusion with no definite compressive abnormality.  The C7 screws project into the C7-T1 disc space and there is a slight posterior protrusion of the left C5 screw. Report Verified by: Vince Aguilera M.D. at 3/13/2018 5:00 PM EDT   Result Narrative   EXAM: CT CERVICAL SPINE WO CONTRAST . INDICATION:  Spinal Stenosis;     TECHNIQUE: CT CERVICAL SPINE WO CONTRAST was performed with axial thin section images. Sagittal and coronal 2D multiplanar reconstructions were performed at the scanner. FINDINGS:    Sagittal reconstructions show normal AP alignment of the cervical spine. Postoperative findings of C3-C7 fusion are present with well incorporated appearing interbody grafts and evidence of previous corpectomies at C4 and C5. The C7 screws project into the C7-T1 disc space. There is no lucency around the screws or other hardware. There is no definite compressive abnormality of the cervical spinal canal. Residual vertebral body hypertrophy and slight left C5 posterior screw protrusion causes minimal effacement of ventral subarachnoid space with no definite cord compression. Cord morphology is poorly visualized due to streak artifacts. There is no evidence of neck mass or adenopathy on this examination. Findings of previous thyroidectomy are present.          Past Medical History:   Diagnosis Date    Degenerative disc disease, thoracic     Amber-Danlos syndrome 2016    GERD (gastroesophageal reflux disease)     IBS (irritable bowel syndrome)     PCOS (polycystic ovarian syndrome)     Spinal stenosis 2009     Patient Active Problem List    Diagnosis Date Noted    IFG (impaired fasting glucose) 01/03/2019    Hyperkalemia 01/02/2019    Acquired hypothyroidism 04/11/2018     Past Surgical History:   Procedure Laterality Date    CERVICAL FUSION  [id number]    6     Family History   Problem Relation Age of Onset    Cancer Father      Social History     Socioeconomic History    Marital status:      Spouse name: None    Number of children: None    Years of education: None    Highest education level: None   Occupational History    None   Tobacco Use    Smoking status: Never Smoker    Smokeless tobacco: Never Used   Vaping Use    Vaping Use: Never used   Substance and Sexual Activity    Alcohol use: Not Currently    Drug use: No    Sexual activity: Yes   Other Topics Concern    None   Social History Narrative    None     Social Determinants of Health     Financial Resource Strain:     Difficulty of Paying Living Expenses: Not on file   Food Insecurity:     Worried About Running Out of Food in the Last Year: Not on file    Sami of Food in the Last Year: Not on file   Transportation Needs:     Lack of Transportation (Medical): Not on file    Lack of Transportation (Non-Medical):  Not on file   Physical Activity:     Days of Exercise per Week: Not on file    Minutes of Exercise per Session: Not on file   Stress:     Feeling of Stress : Not on file   Social Connections:     Frequency of Communication with Friends and Family: Not on file    Frequency of Social Gatherings with Friends and Family: Not on file    Attends Sabianism Services: Not on file    Active Member of [address] [place] [address] or Organizations: Not on file    Attends Club or Organization Meetings: Not on file    Marital Status: Not on file   Intimate Partner Violence:     Fear of Current or Ex-Partner: Not on file    Emotionally Abused: Not on file    Physically Abused: Not on file    Sexually Abused: Not on file   Housing Stability:     Unable to Pay for Housing in the Last Year: Not on file    Number of [place] in the Last Year: Not on file    Unstable Housing in the Last Year: Not on file     Current Outpatient Medications   Medication Sig Dispense Refill    MOTEGRITY 2 MG TABS TAKE 1 TABLET BY MOUTH DAILY      liothyronine (CYTOMEL) 5 MCG tablet Take 1 tablet by mouth daily 30 tablet 2    meloxicam (MOBIC) 15 MG tablet Take 1 tablet by mouth daily      progesterone (PROMETRIUM) 100 MG CAPS capsule Take 200 mg by mouth daily       SYNTHROID 75 MCG tablet Take 1 tablet by mouth every morning (before breakfast) 30 tablet 3    metFORMIN (GLUCOPHAGE) 500 MG tablet Take 1 tablet by mouth 2 times daily (with meals) 60 tablet 11    ondansetron (ZOFRAN) 4 MG tablet Take 4 mg by mouth every 8 hours as needed      topiramate (TOPAMAX) 50 MG tablet Take 150 mg by mouth      spironolactone (ALDACTONE) 100 MG tablet Take 100 mg by mouth 2 times daily       omeprazole (PRILOSEC) 40 MG delayed release capsule Take 40 mg by mouth 2 times daily       TIZANIDINE HCL PO Take 8 mg by mouth       albuterol sulfate  (90 Base) MCG/ACT inhaler Inhale 2 puffs into the lungs every 6 hours as needed (Patient not taking: Reported on 2021)      Rimegepant Sulfate (NURTEC) 75 MG TBDP Nurtec ODT 75 mg disintegrating tablet   DISSOLVE 1 T PO AT ONSET OF MIGRAINE. MAY REPEAT IN 24 HOURS IF NEEDED. MAX EIGHT TABLETS PER MONTH (Patient not taking: Reported on 2021)      drospirenone-ethinyl estradiol (SHAREE) 3-0.02 MG per tablet  (Patient not taking: Reported on 2021)      hydrochlorothiazide (MICROZIDE) 12.5 MG capsule TK 1 C PO TWICE DAILY PRN FOR WATER RETENTION (Patient not taking: Reported on 2021)       No current facility-administered medications for this visit. Allergies   Allergen Reactions    Adhesive Tape Rash     ALL ADHESIVES; Liquid adhesive to close wounds. Gets a red, raised, itchy rash.     Betadine [Povidone Iodine] Hives    Duloxetine Hcl Other (See Comments)    Hydrocodone-Acetaminophen     Other     Sulfa Antibiotics Hives     Family Status   Relation Name Status    Mother     Niall Motley Father     Niall Motley Son  Alive       Review of Systems:  Constitutional: has fatigue, no fever, no recent weight gain, no recent weight loss, has changes in appetite  Eyes: no eye pain, has change in vision, has eye redness, has eye irritation, no double vision  Ears, nose, throat: no nasal congestion, no sore throat, no earache, no decrease in hearing, has hoarseness, no dry mouth, has sinus problems, has difficulty swallowing, no neck lumps, no dental problems, no mouth sores, has ringing in ears  Pulmonary: no shortness of breath, no wheezing, no cough  Cardiovascular: no chest pain, has lower extremity edema, no orthopnea, no intermittent leg claudication, no palpitations  Gastrointestinal: has abdominal pain, has nausea, no vomiting, has diarrhea, has constipation, has heartburn, has bloating  Genitourinary: has dysuria, no urinary incontinence, has urinary hesitancy, has change in urinary frequency, has feelings of urinary urgency, has nocturia  Musculoskeletal: has joint swelling, has joint stiffness, has joint pain, has muscle cramps, hsa muscle pain,has bone pain  Integument/Breast: has hair loss, noskin rashes, no skin lesions, has itching, has dry skin  Neurological: no numbness, has tingling, no weakness, no confusion, no headaches, has dizziness, no fainting, no tremors, no decrease in memory, no balance problems  Psychiatric: no anxiety, no depression, has insomnia  Hematologic/Lymphatic: no tendency for easy bleeding, no swollen lymph nodes, has tendency for easy bruising  Immunology: has seasonal allergies, no frequent infections, no frequent illnesses  Endocrine: has temperature intolerance, no hot flashes, no hand tremor    OBJECTIVE:   There were no vitals taken for this visit.   Wt Readings from Last 3 Encounters:   09/22/20 120 lb (54.4 kg)   09/03/19 119 lb 3.2 oz (54.1 kg)   04/08/19 120 lb 6.4 oz (54.6 kg)       OBJECTIVE:  Constitutional: no apparent distress, well developed and well nourished  Mental status: alert and awake, oriented to person, place and time, able to follow commands  Psychiatric: judgement and insight and normal, recent and remote memory are intact, mood and affect are normal  Skin: skin inspection appears normal, no significant exanthematous lesions or discoloration noted on facial skin  Head and Face: head and face inspection revealed no abnormalities, normocephalic, atraumatic  Eyes: no lid or conjunctival swelling, erythema or discharge, sclera appears normal  Ears/Nose: external inspection of ears and nose revealed no abnormalities, hearing is grossly normal  Oropharynx/Mouth/Face: lips are normal with no lesions, the voice quality was normal  Neck: neck is symmetric, no visualized mass  Pulmonary/chest: respiratory effort normal, no generalized signs of difficulty breathing or signs of respiratory distress  Musculoskeletal: normal station, normal range of motion of neck  Neurological: no facial asymmetry, normal general cortical function      Lab Review:  Lab Results   Component Value Date    TSH 0.189 11/12/2021     No results found for: FREET4      ASSESSMENT/PLAN:  1. Acquired hypothyroidism  Patient need to be seen in person next appointment. Uncontrolled  Has gluten sensitivity  Lost some weight  Has spot on the kidney, undergoing work up  Continue Synthroid to 0.075 mg qd. Decrease Liothyronine to 2.5 mcg daily. Felt better originally on liothyronine  TSH 1.7-0.631.351.170.88-0.6310.189  T3 low, now normal on liothyronine  - T4, Free; Future  - TSH without Reflex; Future  - T3, Free; Future     2. Hyperkalemia  Repeated potassium 4.6.-3.8-4.05.04.14.74.5  Follow CMP. Now normal     3. IFG   Metformin 500 mg bid  Hemoglobin A1c 4.94.85.1  Glucose 80-795989-91  Diet, exercise.   No premature CAD in family  Has strong family history of diabetes    Reviewed and/or ordered clinical lab results Yes  Reviewed and/or ordered radiology tests Yes   Reviewed and/or ordered other diagnostic tests No  Discussed test results with performing physician No  Independently reviewed image, tracing, or specimen No  Made a decision to obtain old records No  Reviewed old records Yes  Obtained history from other than patient No    Sabina Ripper was counseled regarding symptoms of thyroid diagnosis, side effects of medications, treatment options,labs, imaging, treatment targets and goals, prediabetes, diabetes prevention, metformin. She understands instructions and counseling. Wendy Moss is a 54 y.o. female being evaluated by a Virtual Visit (video visit) encounter, including two-way audio and video communication, in lieu of an in-person visit due to coronavirus emergency, to address concerns as mentioned in history and assessment and plan. Patient identification was verified at the start of the visit. I conducted an interview, performed a limited exam by video and educated the patient on my assessment and plan. Due to this being a TeleHealth encounter (During DOLRI-95 public health emergency), evaluation of the following organ systems was limited: Vitals/Constitutional/EENT/Resp/CV/GI//MS/Neuro/Skin/Heme-Lymph-Imm. Pursuant to the emergency declaration under the [address] [place] [address], [address] [place] [address] and the [employer] and [employer] Act, this Virtual Visit was conducted with patient's (and/or legal guardian's) consent, to reduce the patient's risk of exposure to COVID-19 and provide necessary medical care. The patient (and/or legal guardian) has also been advised to contact this office for worsening conditions or problems, and seek emergency medical treatment and/or call 911 if deemed necessary. Total time spent on this encounter via Telehealth (synchronous, real-time audio/visual connection): 30 min    See assessment, plan and counseling note for counseling and care coordination details. Services were provided through a video synchronous discussion virtually to substitute for in-person clinic visit. Persons participating in the telehealth service: provider - Sarah Merrill MD and patient Wendy Moss. Provider was located at her office.   Patient was located at home.        --Cuco Devlin MD on 11/16/2021 at 11:24 AM    An electronic signature was used to authenticate this note. Return in about 3 months (around 2/16/2022) for thyroid problems.

## 2022-02-19 ENCOUNTER — TELEPHONE (OUTPATIENT)
Dept: ENDOCRINOLOGY | Age: 52
End: 2022-02-19

## 2022-02-20 NOTE — TELEPHONE ENCOUNTER
I received lab work, but there is no appointment scheduled. Patient was informed last appointment that she must be seen in person. She needs to schedule in person appointment if she wants to continue to be followed in our office. No lab work order or medication refills unless schedules appointment.

## 2022-04-11 DIAGNOSIS — E03.9 ACQUIRED HYPOTHYROIDISM: ICD-10-CM

## 2022-04-11 RX ORDER — LEVOTHYROXINE SODIUM 75 MCG
TABLET ORAL
Qty: 30 TABLET | Refills: 2 | Status: SHIPPED | OUTPATIENT
Start: 2022-04-11 | End: 2022-04-28 | Stop reason: SDUPTHER

## 2022-04-25 ENCOUNTER — TELEPHONE (OUTPATIENT)
Dept: ENDOCRINOLOGY | Age: 52
End: 2022-04-25

## 2022-04-25 NOTE — TELEPHONE ENCOUNTER
Patient called and stated she is not feeling well. She is  having some heart palpations and feeling hot and her appetite has dramatically increased. If she exercise the palpations get worse. She is wearing a monitor from cardiac she was told that since her last blood work all of her values have increased. So she is wondering if this is coming from the medication. She is wanting to know if she should go back to the 110 synthroid and take the cytomel away.

## 2022-04-28 ENCOUNTER — OFFICE VISIT (OUTPATIENT)
Dept: ENDOCRINOLOGY | Age: 52
End: 2022-04-28
Payer: COMMERCIAL

## 2022-04-28 VITALS
SYSTOLIC BLOOD PRESSURE: 88 MMHG | OXYGEN SATURATION: 99 % | DIASTOLIC BLOOD PRESSURE: 62 MMHG | HEIGHT: 66 IN | BODY MASS INDEX: 19.61 KG/M2 | HEART RATE: 86 BPM | RESPIRATION RATE: 14 BRPM | TEMPERATURE: 98 F | WEIGHT: 122 LBS

## 2022-04-28 DIAGNOSIS — R73.01 IFG (IMPAIRED FASTING GLUCOSE): ICD-10-CM

## 2022-04-28 DIAGNOSIS — E87.5 HYPERKALEMIA: ICD-10-CM

## 2022-04-28 DIAGNOSIS — E03.9 ACQUIRED HYPOTHYROIDISM: Primary | ICD-10-CM

## 2022-04-28 PROCEDURE — G8420 CALC BMI NORM PARAMETERS: HCPCS | Performed by: INTERNAL MEDICINE

## 2022-04-28 PROCEDURE — 1036F TOBACCO NON-USER: CPT | Performed by: INTERNAL MEDICINE

## 2022-04-28 PROCEDURE — 3017F COLORECTAL CA SCREEN DOC REV: CPT | Performed by: INTERNAL MEDICINE

## 2022-04-28 PROCEDURE — G8427 DOCREV CUR MEDS BY ELIG CLIN: HCPCS | Performed by: INTERNAL MEDICINE

## 2022-04-28 PROCEDURE — 99214 OFFICE O/P EST MOD 30 MIN: CPT | Performed by: INTERNAL MEDICINE

## 2022-04-28 RX ORDER — FLUTICASONE PROPIONATE 50 MCG
1 SPRAY, SUSPENSION (ML) NASAL DAILY
COMMUNITY

## 2022-04-28 RX ORDER — LEVOTHYROXINE SODIUM 75 MCG
TABLET ORAL
Qty: 30 TABLET | Refills: 2 | Status: SHIPPED | OUTPATIENT
Start: 2022-04-28 | End: 2022-06-07 | Stop reason: SDUPTHER

## 2022-04-28 NOTE — PROGRESS NOTES
SUBJECTIVE:  Humble Lamas is a 50 y.o. female who is here for hypothyroidism. 1. Acquired hypothyroidism     This started in 2008. Patient was diagnosed with hypothyroidism. The problem has been unchanged. Patient started medication in 2008. Currently patient is on: levothyroxine. Misses  0 doses a month. Tried levothyroxine for 3 months, had heat feeling.     Current complaints: fatigue, dry skin, dry eyes, hair loss. Edema fluctuating. Fatigue is not better. Severe. Hair loss improved. Has recent insomnia  Tachycardia for several months. Has heart defect, question regarding surgery    History of obstructive symptoms: difficulty swallowing Yes, changes in voice/hoarseness Yes.  Had 7 cervical fusions. Has pain.     History of radiation to patient's neck: No  Resent iodine exposure: No  Family history includes hypothyroidism. Family history of thyroid cancer: No     2. Hyperkalemia  No palpitations or chest pain.     3. IFG   Glucose 101-108-79  Stopped Metformin, fluctuating levels of BG when off metformin.         Result Impression   IMPRESSION:    Small thyroid gland without focal lesion. Workstation [id number]   Result Narrative     THYROID ULTRASOUND: 5/17/2018    INDICATION: Hypothyroidism    COMPARISON:  No direct comparison, reference to CT neck July 2015    TECHNIQUE: Ultrasonographic evaluation of the thyroid    FINDINGS:      RIGHT LOBE: 2.9 x 0.8 x 1 cm.  Homogenous echogenicity without discrete nodule. LEFT LOBE:   2.8 x 0.6 x 1 cm.   Homogenous echogenicity without discrete nodule. Isthmus: Unremarkable. Cervical lymph nodes: None identified. Status            IMPRESSION:    Postoperative findings of C3-C7 fusion with no definite compressive abnormality. The C7 screws project into the C7-T1 disc space and there is a slight posterior protrusion of the left C5 screw.       Report Verified by: Demetria Loya M.D. at 3/13/2018 5:00 PM EDT   Result Narrative   EXAM: CT CERVICAL SPINE WO CONTRAST . INDICATION:  Spinal Stenosis;     TECHNIQUE: CT CERVICAL SPINE WO CONTRAST was performed with axial thin section images. Sagittal and coronal 2D multiplanar reconstructions were performed at the scanner. FINDINGS:    Sagittal reconstructions show normal AP alignment of the cervical spine. Postoperative findings of C3-C7 fusion are present with well incorporated appearing interbody grafts and evidence of previous corpectomies at C4 and C5. The C7 screws project into the C7-T1 disc space. There is no lucency around the screws or other hardware. There is no definite compressive abnormality of the cervical spinal canal. Residual vertebral body hypertrophy and slight left C5 posterior screw protrusion causes minimal effacement of ventral subarachnoid space with no definite cord compression. Cord morphology is poorly visualized due to streak artifacts. There is no evidence of neck mass or adenopathy on this examination. Findings of previous thyroidectomy are present.          Past Medical History:   Diagnosis Date    Degenerative disc disease, thoracic     Amber-Danlos syndrome 2016    GERD (gastroesophageal reflux disease)     IBS (irritable bowel syndrome)     PCOS (polycystic ovarian syndrome)     Spinal stenosis 2009     Patient Active Problem List    Diagnosis Date Noted    IFG (impaired fasting glucose) 01/03/2019    Hyperkalemia 01/02/2019    Acquired hypothyroidism 04/11/2018     Past Surgical History:   Procedure Laterality Date    CERVICAL FUSION  [id number]    6     Family History   Problem Relation Age of Onset    Cancer Father      Social History     Socioeconomic History    Marital status:      Spouse name: None    Number of children: None    Years of education: None    Highest education level: None   Occupational History    None   Tobacco Use    Smoking status: Never Smoker    Smokeless tobacco: Never Used   Vaping Use    Vaping Use: Never used   Substance and Sexual Activity    Alcohol use: Not Currently    Drug use: No    Sexual activity: Yes   Other Topics Concern    None   Social History Narrative    None     Social Determinants of Health     Financial Resource Strain:     Difficulty of Paying Living Expenses: Not on file   Food Insecurity:     Worried About Running Out of Food in the Last Year: Not on file    Sami of Food in the Last Year: Not on file   Transportation Needs:     Lack of Transportation (Medical): Not on file    Lack of Transportation (Non-Medical):  Not on file   Physical Activity:     Days of Exercise per Week: Not on file    Minutes of Exercise per Session: Not on file   Stress:     Feeling of Stress : Not on file   Social Connections:     Frequency of Communication with Friends and Family: Not on file    Frequency of Social Gatherings with Friends and Family: Not on file    Attends Druze Services: Not on file    Active Member of [address] [place] [address] [employer] or Organizations: Not on file    Attends Club or Organization Meetings: Not on file    Marital Status: Not on file   Intimate Partner Violence:     Fear of Current or Ex-Partner: Not on file    Emotionally Abused: Not on file    Physically Abused: Not on file    Sexually Abused: Not on file   Housing Stability:     Unable to Pay for Housing in the Last Year: Not on file    [facility]: Not on file    Unstable Housing in the Last Year: Not on file     Current Outpatient Medications   Medication Sig Dispense Refill    fluticasone (FLONASE) 50 MCG/ACT nasal spray 1 spray by Each Nostril route daily      SYNTHROID 75 MCG tablet TAKE 1 TABLET BY MOUTH EVERY MORNING BEFORE BREAKFAST 30 tablet 2    meloxicam (MOBIC) 15 MG tablet Take 1 tablet by mouth daily      progesterone (PROMETRIUM) 100 MG CAPS capsule Take 200 mg by mouth daily       metFORMIN (GLUCOPHAGE) 500 MG tablet Take 1 tablet by mouth 2 times daily (with meals) 60 tablet 11    ondansetron (ZOFRAN) 4 MG tablet Take 4 mg by mouth every 8 hours as needed      topiramate (TOPAMAX) 50 MG tablet Take 150 mg by mouth      spironolactone (ALDACTONE) 100 MG tablet Take 100 mg by mouth 2 times daily       omeprazole (PRILOSEC) 40 MG delayed release capsule Take 40 mg by mouth daily       TIZANIDINE HCL PO Take 8 mg by mouth       MOTEGRITY 2 MG TABS TAKE 1 TABLET BY MOUTH DAILY (Patient not taking: Reported on 2022)      albuterol sulfate  (90 Base) MCG/ACT inhaler Inhale 2 puffs into the lungs every 6 hours as needed (Patient not taking: Reported on 2021)      Rimegepant Sulfate (NURTEC) 75 MG TBDP Nurtec ODT 75 mg disintegrating tablet   DISSOLVE 1 T PO AT ONSET OF MIGRAINE. MAY REPEAT IN 24 HOURS IF NEEDED. MAX EIGHT TABLETS PER MONTH (Patient not taking: Reported on 2021)      drospirenone-ethinyl estradiol (SHAREE) 3-0.02 MG per tablet  (Patient not taking: Reported on 2021)      hydrochlorothiazide (MICROZIDE) 12.5 MG capsule TK 1 C PO TWICE DAILY PRN FOR WATER RETENTION (Patient not taking: Reported on 2021)       No current facility-administered medications for this visit. Allergies   Allergen Reactions    Adhesive Tape Rash     ALL ADHESIVES; Liquid adhesive to close wounds. Gets a red, raised, itchy rash.     Betadine [Povidone Iodine] Hives    Duloxetine Hcl Other (See Comments)    Hydrocodone-Acetaminophen     Other     Sulfa Antibiotics Hives     Family Status   Relation Name Status    Mother     Blase Liming Father     Blase Liming Son  Alive       Review of Systems:  Constitutional: has fatigue, no fever, no recent weight gain, no recent weight loss, has changes in appetite  Eyes: no eye pain, has change in vision, has eye redness, has eye irritation, no double vision  Ears, nose, throat: no nasal congestion, no sore throat, no earache, no decrease in hearing, has hoarseness, no dry mouth, has sinus problems, has difficulty swallowing, no neck lumps, no dental problems, no mouth sores, has ringing in ears  Pulmonary: no shortness of breath, no wheezing, no cough  Cardiovascular: no chest pain, has lower extremity edema, no orthopnea, no intermittent leg claudication, no palpitations  Gastrointestinal: has abdominal pain, has nausea, no vomiting, has diarrhea, has constipation, has heartburn, has bloating  Genitourinary: has dysuria, no urinary incontinence, has urinary hesitancy, has change in urinary frequency, has feelings of urinary urgency, has nocturia  Musculoskeletal: has joint swelling, has joint stiffness, has joint pain, has muscle cramps, hsa muscle pain,has bone pain  Integument/Breast: has hair loss, noskin rashes, no skin lesions, has itching, has dry skin  Neurological: no numbness, has tingling, no weakness, no confusion, no headaches, has dizziness, no fainting, no tremors, no decrease in memory, no balance problems  Psychiatric: no anxiety, no depression, has insomnia  Hematologic/Lymphatic: no tendency for easy bleeding, no swollen lymph nodes, has tendency for easy bruising  Immunology: has seasonal allergies, no frequent infections, no frequent illnesses  Endocrine: has temperature intolerance, no hot flashes, no hand tremor    OBJECTIVE:   BP 88/62   Pulse 86   Temp 98 °F (36.7 °C)   Resp 14   Ht 5' 6\" (1.676 m)   Wt 122 lb (55.3 kg)   SpO2 99%   BMI 19.69 kg/m²   Wt Readings from Last 3 Encounters:   04/28/22 122 lb (55.3 kg)   09/22/20 120 lb (54.4 kg)   09/03/19 119 lb 3.2 oz (54.1 kg)       Physical Exam:  Constitutional: no acute distress, well appearing, well nourished  Psychiatric: oriented to person, place and time, judgement, insight and normal, recent and remote memory and intact and mood, affect are normal  Skin: skin and subcutaneous tissue is normal without mass, normal turgor  Head and Face: examination of head and face revealed no abnormalities  Eyes: no lid or conjunctival swelling, no erythema or discharge, pupils are normal, equal, round, and reactive to light  Ears/Nose: external inspection of ears and nose revealed no abnormalities, hearing is grossly normal  Oropharynx/Mouth/Face: lips, tongue and gums are normal with no lesions, the voice quality was normal  Neck: neck is supple and symmetric, with midline trachea and no masses, thyroid is small  Lymphatics: normal cervical lymph nodes, normal supraclavicular nodes  Pulmonary: no increased work of breathing or signs of respiratory distress, lungs are clear to auscultation  Cardiovascular: normal heart rate and rhythm, normal S1 and S2, no murmurs and pedal pulses and 2+ bilaterally, No edema  Abdomen: abdomen is soft, non-tender with no masses  Musculoskeletal: normal gait and station, exam of the digits and nails are normal  Neurological: normal coordination, normal general cortical function    Lab Review:  Lab Results   Component Value Date    TSH 1.030 02/15/2022     No results found for: FREET4      ASSESSMENT/PLAN:  1. Acquired hypothyroidism  Call in 1 week after stopping liothyronine  Continue same Synthroid, stop liothyronine and follow closely  Has gluten sensitivity  Has spot on the kidney, undergoing work up  Continue Synthroid to 0.075 mg qd. TSH 1.7-0.63-1.35-1.17-0.88-0.631-0.189-1.03  T3 low, now normal on liothyronine  - T4, Free; Future  - TSH without Reflex; Future  - T3, Free; Future     2. Hyperkalemia  Repeated potassium 4.6.-3.8-4.0-5.0-4.1-4.7-4.5  Follow CMP. Now normal     3. IFG   Metformin 500 mg bid  Hemoglobin A1c 4.9-4.8-5.1-5.1  Glucose 09-22-91-05--91-88  Diet, exercise.   No premature CAD in family  Has strong family history of diabetes    Reviewed and/or ordered clinical lab results Yes   Reviewed and/or ordered radiology tests Yes   Reviewed and/or ordered other diagnostic tests No  Discussed test results with performing physician No  Independently reviewed image, tracing, or specimen No  Made a decision to obtain old records No  Reviewed old records Yes  Obtained history from other than patient No    Christina Castro was counseled regarding symptoms of thyroid diagnosis, side effects of medications, treatment options,labs, imaging, treatment targets and goals, thyroid medications and heart, tachycardia, Cytomel. She understands instructions and counseling. Total visit time 30 min        Return in about 3 months (around 7/28/2022) for thyroid problems.

## 2022-06-04 LAB
T3 FREE: 1.4 PG/ML (ref 2.3–4.2)
T4 FREE: 1.3 NG/DL (ref 0.8–1.8)
TSH SERPL DL<=0.05 MIU/L-ACNC: 1.85 MCIU/ML (ref 0.4–4.5)

## 2022-06-05 ENCOUNTER — TELEPHONE (OUTPATIENT)
Dept: ENDOCRINOLOGY | Age: 52
End: 2022-06-05

## 2022-06-06 DIAGNOSIS — E03.9 ACQUIRED HYPOTHYROIDISM: ICD-10-CM

## 2022-06-06 RX ORDER — LIOTHYRONINE SODIUM 5 UG/1
5 TABLET ORAL DAILY
Qty: 30 TABLET | Refills: 3
Start: 2022-06-06 | End: 2022-06-07 | Stop reason: SDUPTHER

## 2022-07-18 ENCOUNTER — OFFICE VISIT (OUTPATIENT)
Dept: ENDOCRINOLOGY | Age: 52
End: 2022-07-18
Payer: COMMERCIAL

## 2022-07-18 VITALS
TEMPERATURE: 98 F | WEIGHT: 115 LBS | OXYGEN SATURATION: 99 % | BODY MASS INDEX: 18.48 KG/M2 | DIASTOLIC BLOOD PRESSURE: 64 MMHG | HEART RATE: 78 BPM | RESPIRATION RATE: 14 BRPM | SYSTOLIC BLOOD PRESSURE: 118 MMHG | HEIGHT: 66 IN

## 2022-07-18 DIAGNOSIS — R73.01 IFG (IMPAIRED FASTING GLUCOSE): ICD-10-CM

## 2022-07-18 DIAGNOSIS — E87.5 HYPERKALEMIA: ICD-10-CM

## 2022-07-18 DIAGNOSIS — E03.9 ACQUIRED HYPOTHYROIDISM: ICD-10-CM

## 2022-07-18 LAB
A/G RATIO: 1.9 (ref 1.1–2.2)
ALBUMIN SERPL-MCNC: 4.6 G/DL (ref 3.4–5)
ALP BLD-CCNC: 82 U/L (ref 40–129)
ALT SERPL-CCNC: 10 U/L (ref 10–40)
ANION GAP SERPL CALCULATED.3IONS-SCNC: 14 MMOL/L (ref 3–16)
AST SERPL-CCNC: 17 U/L (ref 15–37)
BASOPHILS ABSOLUTE: 0 K/UL (ref 0–0.2)
BASOPHILS RELATIVE PERCENT: 0.8 %
BILIRUB SERPL-MCNC: <0.2 MG/DL (ref 0–1)
BUN BLDV-MCNC: 10 MG/DL (ref 7–20)
CALCIUM SERPL-MCNC: 9.3 MG/DL (ref 8.3–10.6)
CHLORIDE BLD-SCNC: 106 MMOL/L (ref 99–110)
CO2: 21 MMOL/L (ref 21–32)
CREAT SERPL-MCNC: 0.9 MG/DL (ref 0.6–1.1)
EOSINOPHILS ABSOLUTE: 0.2 K/UL (ref 0–0.6)
EOSINOPHILS RELATIVE PERCENT: 3.2 %
GFR AFRICAN AMERICAN: >60
GFR NON-AFRICAN AMERICAN: >60
GLUCOSE BLD-MCNC: 65 MG/DL (ref 70–99)
HCT VFR BLD CALC: 42.3 % (ref 36–48)
HEMOGLOBIN: 14.3 G/DL (ref 12–16)
LYMPHOCYTES ABSOLUTE: 1.5 K/UL (ref 1–5.1)
LYMPHOCYTES RELATIVE PERCENT: 27.7 %
MCH RBC QN AUTO: 34 PG (ref 26–34)
MCHC RBC AUTO-ENTMCNC: 33.9 G/DL (ref 31–36)
MCV RBC AUTO: 100.2 FL (ref 80–100)
MONOCYTES ABSOLUTE: 0.4 K/UL (ref 0–1.3)
MONOCYTES RELATIVE PERCENT: 7.5 %
NEUTROPHILS ABSOLUTE: 3.4 K/UL (ref 1.7–7.7)
NEUTROPHILS RELATIVE PERCENT: 60.8 %
PDW BLD-RTO: 12.7 % (ref 12.4–15.4)
PLATELET # BLD: 231 K/UL (ref 135–450)
PMV BLD AUTO: 8.5 FL (ref 5–10.5)
POTASSIUM SERPL-SCNC: 4.9 MMOL/L (ref 3.5–5.1)
RBC # BLD: 4.22 M/UL (ref 4–5.2)
SODIUM BLD-SCNC: 141 MMOL/L (ref 136–145)
T3 FREE: 2.1 PG/ML (ref 2.3–4.2)
T4 FREE: 1.1 NG/DL (ref 0.9–1.8)
TOTAL PROTEIN: 7 G/DL (ref 6.4–8.2)
TSH SERPL DL<=0.05 MIU/L-ACNC: 0.89 UIU/ML (ref 0.27–4.2)
WBC # BLD: 5.6 K/UL (ref 4–11)

## 2022-07-18 PROCEDURE — 99214 OFFICE O/P EST MOD 30 MIN: CPT | Performed by: INTERNAL MEDICINE

## 2022-07-18 RX ORDER — LIOTHYRONINE SODIUM 5 UG/1
5 TABLET ORAL DAILY
Qty: 30 TABLET | Refills: 5 | Status: SHIPPED | OUTPATIENT
Start: 2022-07-18 | End: 2022-10-05 | Stop reason: SDUPTHER

## 2022-07-18 RX ORDER — LEVOTHYROXINE SODIUM 75 MCG
TABLET ORAL
Qty: 30 TABLET | Refills: 5 | Status: SHIPPED | OUTPATIENT
Start: 2022-07-18 | End: 2022-10-05 | Stop reason: SDUPTHER

## 2022-07-18 NOTE — PROGRESS NOTES
SUBJECTIVE:  Ash Wilson is a 50 y.o. female who is here for hypothyroidism. 1. Acquired hypothyroidism     This started in 2008. Patient was diagnosed with hypothyroidism. The problem has been unchanged. Patient started medication in 2008. Currently patient is on: levothyroxine. Misses  0 doses a month. Tried levothyroxine for 3 months, had heat feeling. Current complaints: fatigue, dry skin, dry eyes, hair loss. Edema fluctuating. Fatigue is not better. Severe. Hair loss improved. Has recent insomnia  Tachycardia for several months. Has heart defect, question regarding surgery    History of obstructive symptoms: difficulty swallowing Yes, changes in voice/hoarseness Yes. Had 7 cervical fusions. Has pain. History of radiation to patient's neck: No  Resent iodine exposure: No  Family history includes hypothyroidism. Family history of thyroid cancer: No     2. Hyperkalemia  No palpitations or chest pain. 3. IFG   Glucose 101-108-79  Stopped Metformin, fluctuating levels of BG when off metformin. Result Impression   IMPRESSION:    Small thyroid gland without focal lesion. Workstation [id number]   Result Narrative     THYROID ULTRASOUND: 5/17/2018    INDICATION: Hypothyroidism    COMPARISON:  No direct comparison, reference to CT neck July 2015    TECHNIQUE: Ultrasonographic evaluation of the thyroid    FINDINGS:      RIGHT LOBE: 2.9 x 0.8 x 1 cm. Homogenous echogenicity without discrete nodule. LEFT LOBE:   2.8 x 0.6 x 1 cm. Homogenous echogenicity without discrete nodule. Isthmus: Unremarkable. Cervical lymph nodes: None identified. Status            IMPRESSION:    Postoperative findings of C3-C7 fusion with no definite compressive abnormality. The C7 screws project into the C7-T1 disc space and there is a slight posterior protrusion of the left C5 screw.       Report Verified by: Adelia Hagan M.D. at 3/13/2018 5:00 PM EDT   Result Narrative   EXAM: CT CERVICAL SPINE WO CONTRAST . INDICATION:  Spinal Stenosis;     TECHNIQUE: CT CERVICAL SPINE WO CONTRAST was performed with axial thin section images. Sagittal and coronal 2D multiplanar reconstructions were performed at the scanner. FINDINGS:    Sagittal reconstructions show normal AP alignment of the cervical spine. Postoperative findings of C3-C7 fusion are present with well incorporated appearing interbody grafts and evidence of previous corpectomies at C4 and C5. The C7 screws project into the C7-T1 disc space. There is no lucency around the screws or other hardware. There is no definite compressive abnormality of the cervical spinal canal. Residual vertebral body hypertrophy and slight left C5 posterior screw protrusion causes minimal effacement of ventral subarachnoid space with no definite cord compression. Cord morphology is poorly visualized due to streak artifacts. There is no evidence of neck mass or adenopathy on this examination. Findings of previous thyroidectomy are present.          Past Medical History:   Diagnosis Date    Degenerative disc disease, thoracic     Amber-Danlos syndrome 2016    GERD (gastroesophageal reflux disease)     IBS (irritable bowel syndrome)     PCOS (polycystic ovarian syndrome)     Spinal stenosis 2009     Patient Active Problem List    Diagnosis Date Noted    IFG (impaired fasting glucose) 01/03/2019    Hyperkalemia 01/02/2019    Acquired hypothyroidism 04/11/2018     Past Surgical History:   Procedure Laterality Date    CERVICAL FUSION  [id number]    6     Family History   Problem Relation Age of Onset    Cancer Father      Social History     Socioeconomic History    Marital status:      Spouse name: None    Number of children: None    Years of education: None    Highest education level: None   Tobacco Use    Smoking status: Never    Smokeless tobacco: Never   Vaping Use    Vaping Use: Never used   Substance and Sexual Activity    Alcohol use: Not Currently    Drug use: No    Sexual activity: Yes     Current Outpatient Medications   Medication Sig Dispense Refill    liothyronine (CYTOMEL) 5 MCG tablet Take 1 tablet by mouth in the morning. 30 tablet 5    SYNTHROID 75 MCG tablet TAKE 1 TABLET BY MOUTH EVERY MORNING BEFORE BREAKFAST 30 tablet 5    MOTEGRITY 2 MG TABS TAKE 1 TABLET BY MOUTH DAILY      meloxicam (MOBIC) 15 MG tablet Take 1 tablet by mouth daily      progesterone (PROMETRIUM) 100 MG CAPS capsule Take 200 mg by mouth daily       Rimegepant Sulfate (NURTEC) 75 MG TBDP Nurtec ODT 75 mg disintegrating tablet   DISSOLVE 1 T PO AT ONSET OF MIGRAINE. MAY REPEAT IN 24 HOURS IF NEEDED. MAX EIGHT TABLETS PER MONTH      metFORMIN (GLUCOPHAGE) 500 MG tablet Take 1 tablet by mouth 2 times daily (with meals) 60 tablet 11    ondansetron (ZOFRAN) 4 MG tablet Take 4 mg by mouth every 8 hours as needed      topiramate (TOPAMAX) 50 MG tablet Take 150 mg by mouth      spironolactone (ALDACTONE) 100 MG tablet Take 100 mg by mouth 2 times daily       hydrochlorothiazide (MICROZIDE) 12.5 MG capsule TK 1 C PO TWICE DAILY PRN FOR WATER RETENTION      omeprazole (PRILOSEC) 40 MG delayed release capsule Take 40 mg by mouth daily       TIZANIDINE HCL PO Take 8 mg by mouth       fluticasone (FLONASE) 50 MCG/ACT nasal spray 1 spray by Each Nostril route daily (Patient not taking: Reported on 7/18/2022)      albuterol sulfate  (90 Base) MCG/ACT inhaler Inhale 2 puffs into the lungs every 6 hours as needed (Patient not taking: Reported on 7/18/2022)      drospirenone-ethinyl estradiol (SHAREE) 3-0.02 MG per tablet  (Patient not taking: Reported on 7/18/2022)       No current facility-administered medications for this visit. Allergies   Allergen Reactions    Adhesive Tape Rash     ALL ADHESIVES; Liquid adhesive to close wounds. Gets a red, raised, itchy rash.     Betadine [Povidone Iodine] Hives    Duloxetine Hcl Other (See Comments)    Hydrocodone-Acetaminophen     Other     Sulfa Antibiotics Hives     Family Status   Relation Name Status    Mother      Father      Son  Alive       Review of Systems:  Constitutional: has fatigue, no fever, no recent weight gain, no recent weight loss, has changes in appetite  Eyes: no eye pain, has change in vision, has eye redness, has eye irritation, no double vision  Ears, nose, throat: no nasal congestion, no sore throat, no earache, no decrease in hearing, has hoarseness, no dry mouth, has sinus problems, has difficulty swallowing, no neck lumps, no dental problems, no mouth sores, has ringing in ears  Pulmonary: no shortness of breath, no wheezing, no cough  Cardiovascular: no chest pain, has lower extremity edema, no orthopnea, no intermittent leg claudication, no palpitations  Gastrointestinal: has abdominal pain, has nausea, no vomiting, has diarrhea, has constipation, has heartburn, has bloating  Genitourinary: has dysuria, no urinary incontinence, has urinary hesitancy, has change in urinary frequency, has feelings of urinary urgency, has nocturia  Musculoskeletal: has joint swelling, has joint stiffness, has joint pain, has muscle cramps, hsa muscle pain,has bone pain  Integument/Breast: has hair loss, noskin rashes, no skin lesions, has itching, has dry skin  Neurological: no numbness, has tingling, no weakness, no confusion, no headaches, has dizziness, no fainting, no tremors, no decrease in memory, no balance problems  Psychiatric: no anxiety, no depression, has insomnia  Hematologic/Lymphatic: no tendency for easy bleeding, no swollen lymph nodes, has tendency for easy bruising  Immunology: has seasonal allergies, no frequent infections, no frequent illnesses  Endocrine: has temperature intolerance, no hot flashes, no hand tremor    OBJECTIVE:   /64   Pulse 78   Temp 98 °F (36.7 °C)   Resp 14   Ht 5' 6\" (1.676 m)   Wt 115 lb (52.2 kg)   SpO2 99%   BMI 18.56 kg/m²   Wt Readings from Last 3 Encounters:   22 115 lb (52.2 kg)   04/28/22 122 lb (55.3 kg)   09/22/20 120 lb (54.4 kg)       Physical Exam:  Constitutional: no acute distress, well appearing, well nourished  Psychiatric: oriented to person, place and time, judgement, insight and normal, recent and remote memory and intact and mood, affect are normal  Skin: skin and subcutaneous tissue is normal without mass, normal turgor  Head and Face: examination of head and face revealed no abnormalities  Eyes: no lid or conjunctival swelling, no erythema or discharge, pupils are normal, equal, round, and reactive to light  Ears/Nose: external inspection of ears and nose revealed no abnormalities, hearing is grossly normal  Oropharynx/Mouth/Face: lips, tongue and gums are normal with no lesions, the voice quality was normal  Neck: neck is supple and symmetric, with midline trachea and no masses, thyroid is  small  Lymphatics: normal cervical lymph nodes, normal supraclavicular nodes  Pulmonary: no increased work of breathing or signs of respiratory distress, lungs are clear to auscultation  Cardiovascular: normal heart rate and rhythm, normal S1 and S2, no murmurs and pedal pulses and 2+ bilaterally, No edema  Abdomen: abdomen is soft, non-tender with no masses  Musculoskeletal: normal gait and station, exam of the digits and nails are normal  Neurological: normal coordination, normal general cortical function    Lab Review:  Lab Results   Component Value Date/Time    TSH 1.850 06/04/2022 07:45 AM     No results found for: FREET4      ASSESSMENT/PLAN:  1. Acquired hypothyroidism  Call for results  Has gluten sensitivity  Has spot on the kidney, undergoing work up  Continue Synthroid to 0.075 mg qd. Continue Liothyronine 5 mcg daily  Had weakness and SOB, had work up. Had heart issue, spot on the kidney.   Had echocardiogram. Will have exercise test.  TSH 1.7-0.63-1.35-1.17-0.88-0.631-0.189-1.03-1.85  T3 low, now normal on liothyronine  - T4, Free; Future  - TSH without Reflex; Future  - T3, Free; Future     2. Hyperkalemia  Repeated potassium 4.6.-3.8-4.0-5.0-4.1-4.7-4.5  Follow CMP. Now normal     3. IFG   Metformin 500 mg bid  Hemoglobin A1c 4.9-4.8-5.1-5.1  Glucose 36-26-32-04--91-88  Diet, exercise. No premature CAD in family  Has strong family history of diabetes    Reviewed and/or ordered clinical lab results Yes   Reviewed and/or ordered radiology tests Yes   Reviewed and/or ordered other diagnostic tests No  Discussed test results with performing physician No  Independently reviewed image, tracing, or specimen No  Made a decision to obtain old records No  Reviewed old records Yes  Obtained history from other than patient No    Camille Ruiz was counseled regarding symptoms of thyroid diagnosis, side effects of medications, treatment options,labs, imaging, treatment targets and goals, thyroid medications and heart, tachycardia, Cytomel. She understands instructions and counseling. Total visit time 30 min        Return in about 3 months (around 10/18/2022) for thyroid problems.

## 2022-07-19 RX ORDER — LIOTHYRONINE SODIUM 5 UG/1
5 TABLET ORAL DAILY
Qty: 90 TABLET | OUTPATIENT
Start: 2022-07-19 | End: 2023-07-19

## 2022-07-24 ENCOUNTER — TELEPHONE (OUTPATIENT)
Dept: ENDOCRINOLOGY | Age: 52
End: 2022-07-24

## 2022-07-24 DIAGNOSIS — E03.9 ACQUIRED HYPOTHYROIDISM: Primary | ICD-10-CM

## 2022-07-25 NOTE — TELEPHONE ENCOUNTER
Please inform patient:  TSH is very good, on the low side, that means that thyroid medications are on higher side of normal.  Free T3 is slightly below normal.  I do not think that more Cytomel is indicated, because TSH is good. If I increase Cytomel, then Synthroid needs to be decreased and T4 and T3 proportion will be abnormal and possibly with more risk of arrhythmia and bone loss. Glucose was slightly below normal, 65. Please discuss CBC with family doctor. I noted that there is no appointment scheduled. Needs to schedule follow-up appointment and I will order labs. Let me know when schedules.

## 2022-07-25 NOTE — TELEPHONE ENCOUNTER
Called and informed pt, pt expressed understanding. Pt scheduled f/up for 10/31/22.  Pt requested labs forwarded to Dr Ha Miller, Efaxed to [phone]

## 2022-10-05 DIAGNOSIS — E03.9 ACQUIRED HYPOTHYROIDISM: ICD-10-CM

## 2022-10-05 RX ORDER — LIOTHYRONINE SODIUM 5 UG/1
5 TABLET ORAL DAILY
Qty: 90 TABLET | Refills: 0 | Status: SHIPPED | OUTPATIENT
Start: 2022-10-05 | End: 2022-10-31 | Stop reason: SDUPTHER

## 2022-10-05 RX ORDER — LEVOTHYROXINE SODIUM 75 MCG
TABLET ORAL
Qty: 90 TABLET | Refills: 0 | Status: SHIPPED | OUTPATIENT
Start: 2022-10-05 | End: 2022-10-31 | Stop reason: SDUPTHER

## 2022-10-05 NOTE — TELEPHONE ENCOUNTER
Fax from [address] by [facility] requesting rx for liothyronine/synthroid . 90 day.

## 2022-10-11 LAB
ABSOLUTE IMMATURE GRANULOCYTE: 0 K/UL (ref 0–0.1)
BASOPHILS ABSOLUTE: 0.1 K/UL (ref 0–0.3)
BASOPHILS RELATIVE PERCENT: 1.1 % (ref 0–2)
DIFFERENTIAL TYPE: NORMAL
EOSINOPHILS ABSOLUTE: 0.1 K/UL (ref 0–0.5)
EOSINOPHILS RELATIVE PERCENT: 1.3 % (ref 0–5)
HCT VFR BLD CALC: 44.7 % (ref 34–49)
HEMOGLOBIN: 15.3 G/DL (ref 11.2–15.7)
IMMATURE GRANULOCYTES: 0.2 %
LYMPHOCYTES ABSOLUTE: 1.8 K/UL (ref 0.9–4.1)
LYMPHOCYTES RELATIVE PERCENT: 31.7 % (ref 14–51)
MCH RBC QN AUTO: 32.6 PG (ref 26–34)
MCHC RBC AUTO-ENTMCNC: 34.2 G/DL (ref 30.7–35.5)
MCV RBC AUTO: 95.1 FL (ref 80–100)
MONOCYTES ABSOLUTE: 0.5 K/UL (ref 0.2–1)
MONOCYTES RELATIVE PERCENT: 8.3 % (ref 4–12)
NEUTROPHILS ABSOLUTE: 3.2 K/UL (ref 1.8–7.5)
NEUTROPHILS RELATIVE PERCENT: 57.4 % (ref 42–80)
NUCLEATED RBCS: 0 /100 WBC
PDW BLD-RTO: 11.6 %
PLATELET # BLD: 232 K/UL (ref 140–400)
PMV BLD AUTO: 10.5 FL (ref 7.2–11.7)
RBC # BLD: 4.7 M/UL (ref 3.95–5.26)
T3 FREE: 2.1 PG/ML (ref 2.3–4.2)
T4 FREE: 1.22 NG/DL (ref 0.8–1.8)
TSH SERPL DL<=0.05 MIU/L-ACNC: 0.53 MCIU/ML (ref 0.4–4.5)
WBC: 5.6 K/UL (ref 3.5–10.9)

## 2022-10-31 ENCOUNTER — OFFICE VISIT (OUTPATIENT)
Dept: ENDOCRINOLOGY | Age: 52
End: 2022-10-31
Payer: COMMERCIAL

## 2022-10-31 VITALS
OXYGEN SATURATION: 97 % | WEIGHT: 109 LBS | BODY MASS INDEX: 17.52 KG/M2 | TEMPERATURE: 98 F | HEIGHT: 66 IN | RESPIRATION RATE: 14 BRPM | SYSTOLIC BLOOD PRESSURE: 118 MMHG | DIASTOLIC BLOOD PRESSURE: 70 MMHG | HEART RATE: 84 BPM

## 2022-10-31 DIAGNOSIS — E55.9 VITAMIN D DEFICIENCY DISEASE: ICD-10-CM

## 2022-10-31 DIAGNOSIS — R73.01 IFG (IMPAIRED FASTING GLUCOSE): ICD-10-CM

## 2022-10-31 DIAGNOSIS — E03.9 ACQUIRED HYPOTHYROIDISM: Primary | ICD-10-CM

## 2022-10-31 DIAGNOSIS — E87.5 HYPERKALEMIA: ICD-10-CM

## 2022-10-31 PROCEDURE — G8484 FLU IMMUNIZE NO ADMIN: HCPCS | Performed by: INTERNAL MEDICINE

## 2022-10-31 PROCEDURE — G8419 CALC BMI OUT NRM PARAM NOF/U: HCPCS | Performed by: INTERNAL MEDICINE

## 2022-10-31 PROCEDURE — G8427 DOCREV CUR MEDS BY ELIG CLIN: HCPCS | Performed by: INTERNAL MEDICINE

## 2022-10-31 PROCEDURE — 3017F COLORECTAL CA SCREEN DOC REV: CPT | Performed by: INTERNAL MEDICINE

## 2022-10-31 PROCEDURE — 99214 OFFICE O/P EST MOD 30 MIN: CPT | Performed by: INTERNAL MEDICINE

## 2022-10-31 PROCEDURE — 1036F TOBACCO NON-USER: CPT | Performed by: INTERNAL MEDICINE

## 2022-10-31 RX ORDER — MINOCYCLINE HYDROCHLORIDE 50 MG/1
TABLET ORAL
COMMUNITY
Start: 2022-08-09

## 2022-10-31 RX ORDER — LEVOTHYROXINE SODIUM 75 MCG
TABLET ORAL
Qty: 90 TABLET | Refills: 1 | Status: SHIPPED | OUTPATIENT
Start: 2022-10-31

## 2022-10-31 RX ORDER — LIOTHYRONINE SODIUM 5 UG/1
2.5 TABLET ORAL DAILY
Qty: 45 TABLET | Refills: 1 | Status: SHIPPED | OUTPATIENT
Start: 2022-10-31 | End: 2023-10-31

## 2022-10-31 RX ORDER — BUDESONIDE 3 MG/1
CAPSULE, COATED PELLETS ORAL
COMMUNITY
Start: 2022-10-28

## 2022-10-31 RX ORDER — MIDODRINE HYDROCHLORIDE 5 MG/1
TABLET ORAL
COMMUNITY

## 2022-10-31 NOTE — PROGRESS NOTES
SUBJECTIVE:  Toya Carr is a 50 y.o. female who is here for hypothyroidism. 1. Acquired hypothyroidism     This started in 2008. Patient was diagnosed with hypothyroidism. The problem has been unchanged. Patient started medication in 2008. Currently patient is on: Synthroid. Misses  0 doses a month. Tried levothyroxine for 3 months, had heat feeling. Current complaints: fatigue, dry skin, dry eyes. Fatigue is not better. Hair loss improved. Has insomnia  Tachycardia for several months. Takes Midodrine. Has heart defect, question regarding surgery    History of obstructive symptoms: difficulty swallowing Yes, changes in voice/hoarseness Yes. Had 7 cervical fusions. Has pain. History of radiation to patient's neck: No  Resent iodine exposure: No  Family history includes hypothyroidism. Family history of thyroid cancer: No     2. Hyperkalemia  No palpitations, unless exercises. Has chest pain at night. 3. IFG   Glucose 101-108-79  Stopped Metformin, fluctuating levels of BG when off metformin. 4. Vitamin D deficiency  Has fatigue    Result Impression   IMPRESSION:    Small thyroid gland without focal lesion. Workstation [id number]   Result Narrative     THYROID ULTRASOUND: 5/17/2018    INDICATION: Hypothyroidism    COMPARISON:  No direct comparison, reference to CT neck July 2015    TECHNIQUE: Ultrasonographic evaluation of the thyroid    FINDINGS:      RIGHT LOBE: 2.9 x 0.8 x 1 cm. Homogenous echogenicity without discrete nodule. LEFT LOBE:   2.8 x 0.6 x 1 cm. Homogenous echogenicity without discrete nodule. Isthmus: Unremarkable. Cervical lymph nodes: None identified. Status            IMPRESSION:    Postoperative findings of C3-C7 fusion with no definite compressive abnormality. The C7 screws project into the C7-T1 disc space and there is a slight posterior protrusion of the left C5 screw.       Report Verified by: Kezia Hampton M.D. at 3/13/2018 5:00 PM EDT   Result Narrative   EXAM: CT CERVICAL SPINE WO CONTRAST . INDICATION:  Spinal Stenosis;     TECHNIQUE: CT CERVICAL SPINE WO CONTRAST was performed with axial thin section images. Sagittal and coronal 2D multiplanar reconstructions were performed at the scanner. FINDINGS:    Sagittal reconstructions show normal AP alignment of the cervical spine. Postoperative findings of C3-C7 fusion are present with well incorporated appearing interbody grafts and evidence of previous corpectomies at C4 and C5. The C7 screws project into the C7-T1 disc space. There is no lucency around the screws or other hardware. There is no definite compressive abnormality of the cervical spinal canal. Residual vertebral body hypertrophy and slight left C5 posterior screw protrusion causes minimal effacement of ventral subarachnoid space with no definite cord compression. Cord morphology is poorly visualized due to streak artifacts. There is no evidence of neck mass or adenopathy on this examination. Findings of previous thyroidectomy are present.          Past Medical History:   Diagnosis Date    Degenerative disc disease, thoracic     Amber-Danlos syndrome 2016    GERD (gastroesophageal reflux disease)     IBS (irritable bowel syndrome)     PCOS (polycystic ovarian syndrome)     Spinal stenosis 2009     Patient Active Problem List    Diagnosis Date Noted    Vitamin D deficiency disease 10/31/2022    IFG (impaired fasting glucose) 01/03/2019    Hyperkalemia 01/02/2019    Acquired hypothyroidism 04/11/2018     Past Surgical History:   Procedure Laterality Date    CERVICAL FUSION  [id number]    6     Family History   Problem Relation Age of Onset    Cancer Father      Social History     Socioeconomic History    Marital status:      Spouse name: None    Number of children: None    Years of education: None    Highest education level: None   Tobacco Use    Smoking status: Never    Smokeless tobacco: Never   Vaping Use    Vaping Use: Never used   Substance and Sexual Activity    Alcohol use: Not Currently    Drug use: No    Sexual activity: Yes     Current Outpatient Medications   Medication Sig Dispense Refill    budesonide (ENTOCORT EC) 3 MG extended release capsule TAKE 3 CAPSULES BY MOUTH EVERY DAY      midodrine (PROAMATINE) 5 MG tablet midodrine 5 mg tablet   TAKE 1 TABLET BY MOUTH THREE TIMES DAILY FOR 30 DAYS      minocycline (DYNACIN) 50 MG tablet TAKE 1 TABLET BY MOUTH TWICE DAILY      liothyronine (CYTOMEL) 5 MCG tablet Take 0.5 tablets by mouth daily 45 tablet 1    SYNTHROID 75 MCG tablet TAKE 1 TABLET BY MOUTH EVERY MORNING BEFORE BREAKFAST 90 tablet 1    meloxicam (MOBIC) 15 MG tablet Take 1 tablet by mouth daily      progesterone (PROMETRIUM) 100 MG CAPS capsule Take 200 mg by mouth daily       metFORMIN (GLUCOPHAGE) 500 MG tablet Take 1 tablet by mouth 2 times daily (with meals) 60 tablet 11    ondansetron (ZOFRAN) 4 MG tablet Take 4 mg by mouth every 8 hours as needed      topiramate (TOPAMAX) 50 MG tablet Take 150 mg by mouth      spironolactone (ALDACTONE) 100 MG tablet Take 100 mg by mouth 2 times daily       omeprazole (PRILOSEC) 40 MG delayed release capsule Take 40 mg by mouth daily       TIZANIDINE HCL PO Take 8 mg by mouth       fluticasone (FLONASE) 50 MCG/ACT nasal spray 1 spray by Each Nostril route daily (Patient not taking: No sig reported)      MOTEGRITY 2 MG TABS TAKE 1 TABLET BY MOUTH DAILY (Patient not taking: Reported on 10/31/2022)      albuterol sulfate  (90 Base) MCG/ACT inhaler Inhale 2 puffs into the lungs every 6 hours as needed (Patient not taking: No sig reported)      Rimegepant Sulfate (NURTEC) 75 MG TBDP Nurtec ODT 75 mg disintegrating tablet   DISSOLVE 1 T PO AT ONSET OF MIGRAINE. MAY REPEAT IN 24 HOURS IF NEEDED.  MAX EIGHT TABLETS PER MONTH (Patient not taking: Reported on 10/31/2022)      drospirenone-ethinyl estradiol (SHAREE) 3-0.02 MG per tablet  (Patient not taking: No sig reported) hydrochlorothiazide (MICROZIDE) 12.5 MG capsule TK 1 C PO TWICE DAILY PRN FOR WATER RETENTION (Patient not taking: Reported on 10/31/2022)       No current facility-administered medications for this visit. Allergies   Allergen Reactions    Adhesive Tape Rash     ALL ADHESIVES; Liquid adhesive to close wounds. Gets a red, raised, itchy rash.     Betadine [Povidone Iodine] Hives    Duloxetine Hcl Other (See Comments)    Hydrocodone-Acetaminophen     Other     Sulfa Antibiotics Hives     Family Status   Relation Name Status    Mother      Father      Son  Alive       Review of Systems:  Constitutional: has fatigue, no fever, no recent weight gain, no recent weight loss, has changes in appetite  Eyes: no eye pain, has change in vision, has eye redness, has eye irritation, no double vision  Ears, nose, throat: no nasal congestion, no sore throat, no earache, no decrease in hearing, has hoarseness, no dry mouth, has sinus problems, has difficulty swallowing, no neck lumps, no dental problems, no mouth sores, has ringing in ears  Pulmonary: no shortness of breath, no wheezing, no cough  Cardiovascular: no chest pain, has lower extremity edema, no orthopnea, no intermittent leg claudication, no palpitations  Gastrointestinal: has abdominal pain, has nausea, no vomiting, has diarrhea, has constipation, has heartburn, has bloating  Genitourinary: has dysuria, no urinary incontinence, has urinary hesitancy, has change in urinary frequency, has feelings of urinary urgency, has nocturia  Musculoskeletal: has joint swelling, has joint stiffness, has joint pain, has muscle cramps, hsa muscle pain,has bone pain  Integument/Breast: has hair loss, noskin rashes, no skin lesions, has itching, has dry skin  Neurological: no numbness, has tingling, no weakness, no confusion, no headaches, has dizziness, no fainting, no tremors, no decrease in memory, no balance problems  Psychiatric: no anxiety, no depression, has insomnia  Hematologic/Lymphatic: no tendency for easy bleeding, no swollen lymph nodes, has tendency for easy bruising  Immunology: has seasonal allergies, no frequent infections, no frequent illnesses  Endocrine: has temperature intolerance, no hot flashes, no hand tremor    OBJECTIVE:   /70   Pulse 84   Temp 98 °F (36.7 °C)   Resp 14   Ht 5' 6\" (1.676 m)   Wt 109 lb (49.4 kg)   SpO2 97%   BMI 17.59 kg/m²   Wt Readings from Last 3 Encounters:   10/31/22 109 lb (49.4 kg)   07/18/22 115 lb (52.2 kg)   04/28/22 122 lb (55.3 kg)       Physical Exam:  Constitutional: no acute distress, well appearing, well nourished  Psychiatric: oriented to person, place and time, judgement, insight and normal, recent and remote memory and intact and mood, affect are normal  Skin: skin and subcutaneous tissue is normal without mass, normal turgor  Head and Face: examination of head and face revealed no abnormalities  Eyes: no lid or conjunctival swelling, no erythema or discharge, pupils are normal, equal, round, and reactive to light  Ears/Nose: external inspection of ears and nose revealed no abnormalities, hearing is grossly normal  Oropharynx/Mouth/Face: lips, tongue and gums are normal with no lesions, the voice quality was normal  Neck: neck is supple and symmetric, with midline trachea and no masses, thyroid is  small  Lymphatics: normal cervical lymph nodes, normal supraclavicular nodes  Pulmonary: no increased work of breathing or signs of respiratory distress, lungs are clear to auscultation  Cardiovascular: normal heart rate and rhythm, normal S1 and S2, no murmurs and pedal pulses and 2+ bilaterally, No edema  Abdomen: abdomen is soft, non-tender with no masses  Musculoskeletal: normal gait and station, exam of the digits and nails are normal  Neurological: normal coordination, normal general cortical function    Lab Review:  Lab Results   Component Value Date/Time    TSH 0.531 10/11/2022 11:59 AM     No results found for: FREET4      ASSESSMENT/PLAN:  1. Acquired hypothyroidism  Dx with Colitis. Dx with POTS  No gluten, dairy, fatty foods. Has gluten sensitivity  Has spot on the kidney, undergoing work up  Continue Synthroid to 0.075 mg qd. Continue Liothyronine 2.5 mcg daily  TSH 1.7-0.63-1.35-1.17-0.88-0.631-0.189-1.03-0.89-0.531  T3 low, now normal on liothyronine  - T4, Free; Future  - TSH without Reflex; Future  - T3, Free; Future     2. Hyperkalemia  Repeated potassium 4.6.-3.8-4.0-5.0-4.1-4.7-4.5-4.6  Follow CMP. Now normal     3. IFG   Continue close monitoring  Dx with Colitis. Metformin 500 mg bid  Stopping Metformin caused sugar disbalance. Hemoglobin A1c 4.9-4.8-5.1-5.1  Glucose 41-28-84-54--91-88  Diet, exercise. No premature CAD in family  Has strong family history of diabetes    4. Vitamin D deficiency  - 25OHvitamin D  Vitamin D 2000 IU daily    Reviewed and/or ordered clinical lab results Yes   Reviewed and/or ordered radiology tests Yes   Reviewed and/or ordered other diagnostic tests No  Discussed test results with performing physician No  Independently reviewed image, tracing, or specimen No  Made a decision to obtain old records No  Reviewed old records Yes  Obtained history from other than patient No    Jennifer Porter was counseled regarding symptoms of thyroid diagnosis, side effects of medications, treatment options,labs, imaging, treatment targets and goals, thyroid medications and heart, tachycardia, Cytomel. She understands instructions and counseling. Total visit time 30 min      Return in about 3 months (around 1/31/2023) for thyroid problems.

## 2022-11-29 ENCOUNTER — TELEPHONE (OUTPATIENT)
Dept: ENDOCRINOLOGY | Age: 52
End: 2022-11-29

## 2022-11-29 NOTE — TELEPHONE ENCOUNTER
Please advise if pt must have [employer]     Per denial, only states plan exclusion    See where pt tried generic for 3 months and had heat feeling.     Do you want me to appeal?

## 2022-11-29 NOTE — TELEPHONE ENCOUNTER
Denied today  Request Reference Number: [id number].  SYNTHROID TAB 75MCG is denied for not meeting the prior authorization requirement(s)    See letter in Media tab

## 2022-11-30 NOTE — TELEPHONE ENCOUNTER
Please appeal.  As you noted, she had side effects on levothyroxine. In addition, patient has been on Synthroid for a while and it is effective for her without side effects.   If insurance denies Synthroid, please notify patient about Synthroid delivers program.

## 2022-12-07 NOTE — TELEPHONE ENCOUNTER
Viviana Harris from [facility] called stating that they received the appeal letter for Rx Synthroid from our office     Viviana Harris stated that the Rx for Synthroid has been approved and they will be mailing the approval letter to the office      Please advise

## 2023-03-01 LAB
ALBUMIN/GLOBULIN RATIO: 2 RATIO (ref 0.8–2.6)
ALBUMIN: 4.7 G/DL (ref 3.5–5.2)
ALP BLD-CCNC: 84 U/L (ref 23–144)
ALT SERPL-CCNC: 10 U/L (ref 0–60)
AST SERPL-CCNC: 15 U/L (ref 0–55)
BILIRUB SERPL-MCNC: 0.2 MG/DL (ref 0–1.2)
BUN BLDV-MCNC: 20 MG/DL (ref 3–29)
BUN/CREAT BLD: 20 (ref 7–25)
CALCIUM SERPL-MCNC: 9.6 MG/DL (ref 8.5–10.5)
CHLORIDE BLD-SCNC: 106 MEQ/L (ref 96–110)
CO2: 23 MEQ/L (ref 19–32)
CREAT SERPL-MCNC: 1 MG/DL (ref 0.5–1.2)
GLOBULIN: 2.3 G/DL (ref 1.9–3.6)
GLOMERULAR FILTRATION RATE: 68 MLS/MIN/1.73M2
GLUCOSE BLD-MCNC: 93 MG/DL (ref 70–99)
HBA1C MFR BLD: 5.1 % (ref 4–6)
POTASSIUM SERPL-SCNC: 4.7 MEQ/L (ref 3.4–5.3)
SODIUM BLD-SCNC: 139 MEQ/L (ref 135–148)
STATUS: NORMAL
T3 FREE: 2.1 PG/ML (ref 2.3–4.2)
T4 FREE: 1.21 NG/DL (ref 0.8–1.8)
TOTAL PROTEIN: 7 G/DL (ref 6–8.3)
TSH SERPL DL<=0.05 MIU/L-ACNC: 0.73 MCIU/ML (ref 0.4–4.5)
VITAMIN D 25-HYDROXY: 31 NG/ML (ref 30–100)

## 2023-03-02 ENCOUNTER — OFFICE VISIT (OUTPATIENT)
Dept: ENDOCRINOLOGY | Age: 53
End: 2023-03-02
Payer: COMMERCIAL

## 2023-03-02 VITALS
SYSTOLIC BLOOD PRESSURE: 102 MMHG | RESPIRATION RATE: 14 BRPM | HEIGHT: 66 IN | DIASTOLIC BLOOD PRESSURE: 68 MMHG | TEMPERATURE: 98 F | HEART RATE: 76 BPM | WEIGHT: 114 LBS | BODY MASS INDEX: 18.32 KG/M2 | OXYGEN SATURATION: 98 %

## 2023-03-02 DIAGNOSIS — G90.9 AUTONOMIC DYSFUNCTION: ICD-10-CM

## 2023-03-02 DIAGNOSIS — E03.9 ACQUIRED HYPOTHYROIDISM: Primary | ICD-10-CM

## 2023-03-02 DIAGNOSIS — R73.01 IFG (IMPAIRED FASTING GLUCOSE): ICD-10-CM

## 2023-03-02 DIAGNOSIS — E55.9 VITAMIN D DEFICIENCY DISEASE: ICD-10-CM

## 2023-03-02 PROCEDURE — G8484 FLU IMMUNIZE NO ADMIN: HCPCS | Performed by: INTERNAL MEDICINE

## 2023-03-02 PROCEDURE — G8427 DOCREV CUR MEDS BY ELIG CLIN: HCPCS | Performed by: INTERNAL MEDICINE

## 2023-03-02 PROCEDURE — 3017F COLORECTAL CA SCREEN DOC REV: CPT | Performed by: INTERNAL MEDICINE

## 2023-03-02 PROCEDURE — 1036F TOBACCO NON-USER: CPT | Performed by: INTERNAL MEDICINE

## 2023-03-02 PROCEDURE — G8419 CALC BMI OUT NRM PARAM NOF/U: HCPCS | Performed by: INTERNAL MEDICINE

## 2023-03-02 PROCEDURE — 99214 OFFICE O/P EST MOD 30 MIN: CPT | Performed by: INTERNAL MEDICINE

## 2023-03-02 RX ORDER — LIOTHYRONINE SODIUM 5 UG/1
2.5 TABLET ORAL DAILY
Qty: 45 TABLET | Refills: 1 | Status: SHIPPED | OUTPATIENT
Start: 2023-03-02 | End: 2024-03-01

## 2023-03-02 RX ORDER — PANTOPRAZOLE SODIUM 40 MG/1
TABLET, DELAYED RELEASE ORAL
COMMUNITY
Start: 2023-02-07

## 2023-03-02 RX ORDER — LEVOTHYROXINE SODIUM 75 MCG
TABLET ORAL
Qty: 90 TABLET | Refills: 1 | Status: SHIPPED | OUTPATIENT
Start: 2023-03-02

## 2023-03-02 NOTE — PROGRESS NOTES
SUBJECTIVE:  Paulo Pantoja is a 48 y.o. female who is here for hypothyroidism. 1. Acquired hypothyroidism     This started in 2008. Patient was diagnosed with hypothyroidism. The problem has been unchanged. Patient started medication in 2008. Currently patient is on: Synthroid. Misses  0 doses a month. Tried levothyroxine for 3 months, had heat feeling. Current complaints: fatigue, dry skin, dry eyes. Fatigue is not better. Hair loss improved. Has insomnia  Tachycardia for several months. Takes Midodrine. Has heart defect, monitored. History of obstructive symptoms: difficulty swallowing Yes, changes in voice/hoarseness Yes. Had 7 cervical fusions. Has pain. History of radiation to patient's neck: No  Resent iodine exposure: No  Family history includes hypothyroidism. Family history of thyroid cancer: No     2. Autonomic dysfunction  Has palpitations, CP, SOB when exercises. Feels very bad, severe symptoms. Has autonomic dysfunction  Sees Cardiologist.  Stopping spironolactone did not help. Now is back. Midodrine does not help with exercise. Stopping Cytomel did not help. 3. IFG   Glucose [phone]  Stopped Metformin, fluctuating levels of BG when off metformin. Back on Metformin 500 mg bid. 4. Vitamin D deficiency  Has fatigue    Result Impression   IMPRESSION:    Small thyroid gland without focal lesion. Workstation [id number]   Result Narrative     THYROID ULTRASOUND: 5/17/2018    INDICATION: Hypothyroidism    COMPARISON:  No direct comparison, reference to CT neck July 2015    TECHNIQUE: Ultrasonographic evaluation of the thyroid    FINDINGS:      RIGHT LOBE: 2.9 x 0.8 x 1 cm. Homogenous echogenicity without discrete nodule. LEFT LOBE:   2.8 x 0.6 x 1 cm. Homogenous echogenicity without discrete nodule. Isthmus: Unremarkable. Cervical lymph nodes: None identified.    Status            IMPRESSION:    Postoperative findings of C3-C7 fusion with no definite compressive abnormality. The C7 screws project into the C7-T1 disc space and there is a slight posterior protrusion of the left C5 screw. Report Verified by: Jazlyn Oh M.D. at 3/13/2018 5:00 PM EDT   Result Narrative   EXAM: CT CERVICAL SPINE WO CONTRAST . INDICATION:  Spinal Stenosis;     TECHNIQUE: CT CERVICAL SPINE WO CONTRAST was performed with axial thin section images. Sagittal and coronal 2D multiplanar reconstructions were performed at the scanner. FINDINGS:    Sagittal reconstructions show normal AP alignment of the cervical spine. Postoperative findings of C3-C7 fusion are present with well incorporated appearing interbody grafts and evidence of previous corpectomies at C4 and C5. The C7 screws project into the C7-T1 disc space. There is no lucency around the screws or other hardware. There is no definite compressive abnormality of the cervical spinal canal. Residual vertebral body hypertrophy and slight left C5 posterior screw protrusion causes minimal effacement of ventral subarachnoid space with no definite cord compression. Cord morphology is poorly visualized due to streak artifacts. There is no evidence of neck mass or adenopathy on this examination. Findings of previous thyroidectomy are present.          Past Medical History:   Diagnosis Date    Degenerative disc disease, thoracic     Amber-Danlos syndrome 2016    GERD (gastroesophageal reflux disease)     IBS (irritable bowel syndrome)     PCOS (polycystic ovarian syndrome)     Spinal stenosis 2009     Patient Active Problem List    Diagnosis Date Noted    Autonomic dysfunction 03/02/2023    Vitamin D deficiency disease 10/31/2022    IFG (impaired fasting glucose) 01/03/2019    Hyperkalemia 01/02/2019    Acquired hypothyroidism 04/11/2018     Past Surgical History:   Procedure Laterality Date    CERVICAL FUSION  [id number]    6    LITHOTRIPSY  02/15/2023     Family History   Problem Relation Age of Onset    Cancer Father Social History     Socioeconomic History    Marital status:      Spouse name: None    Number of children: None    Years of education: None    Highest education level: None   Tobacco Use    Smoking status: Never    Smokeless tobacco: Never   Vaping Use    Vaping Use: Never used   Substance and Sexual Activity    Alcohol use: Not Currently    Drug use: No    Sexual activity: Yes     Current Outpatient Medications   Medication Sig Dispense Refill    pantoprazole (PROTONIX) 40 MG tablet TAKE 1 TABLET BY MOUTH EVERY MORNING 45 MINUTES BEFORE BREAKFAST      liothyronine (CYTOMEL) 5 MCG tablet Take 0.5 tablets by mouth daily 45 tablet 1    SYNTHROID 75 MCG tablet TAKE 1 TABLET BY MOUTH EVERY MORNING BEFORE BREAKFAST 90 tablet 1    budesonide (ENTOCORT EC) 3 MG extended release capsule TAKE 3 CAPSULES BY MOUTH EVERY DAY      midodrine (PROAMATINE) 5 MG tablet midodrine 5 mg tablet   TAKE 1 TABLET BY MOUTH THREE TIMES DAILY FOR 30 DAYS      minocycline (DYNACIN) 50 MG tablet TAKE 1 TABLET BY MOUTH TWICE DAILY      meloxicam (MOBIC) 15 MG tablet Take 1 tablet by mouth daily      progesterone (PROMETRIUM) 100 MG CAPS capsule Take 200 mg by mouth daily       metFORMIN (GLUCOPHAGE) 500 MG tablet Take 1 tablet by mouth 2 times daily (with meals) 60 tablet 11    ondansetron (ZOFRAN) 4 MG tablet Take 4 mg by mouth every 8 hours as needed      topiramate (TOPAMAX) 50 MG tablet Take 150 mg by mouth      spironolactone (ALDACTONE) 100 MG tablet Take 100 mg by mouth 2 times daily       TIZANIDINE HCL PO Take 8 mg by mouth       fluticasone (FLONASE) 50 MCG/ACT nasal spray 1 spray by Each Nostril route daily (Patient not taking: No sig reported)      MOTEGRITY 2 MG TABS TAKE 1 TABLET BY MOUTH DAILY (Patient not taking: No sig reported)      albuterol sulfate  (90 Base) MCG/ACT inhaler Inhale 2 puffs into the lungs every 6 hours as needed (Patient not taking: No sig reported)      Rimegepant Sulfate (NURTEC) 75 MG TBDP Nurtec ODT 75 mg disintegrating tablet   DISSOLVE 1 T PO AT ONSET OF MIGRAINE. MAY REPEAT IN 24 HOURS IF NEEDED. MAX EIGHT TABLETS PER MONTH (Patient not taking: No sig reported)      drospirenone-ethinyl estradiol (SHAREE) 3-0.02 MG per tablet  (Patient not taking: No sig reported)      hydrochlorothiazide (MICROZIDE) 12.5 MG capsule TK 1 C PO TWICE DAILY PRN FOR WATER RETENTION (Patient not taking: No sig reported)      omeprazole (PRILOSEC) 40 MG delayed release capsule Take 40 mg by mouth daily  (Patient not taking: Reported on 3/2/2023)       No current facility-administered medications for this visit. Allergies   Allergen Reactions    Adhesive Tape Rash     ALL ADHESIVES; Liquid adhesive to close wounds. Gets a red, raised, itchy rash.     Betadine [Povidone Iodine] Hives    Duloxetine Hcl Other (See Comments)    Hydrocodone-Acetaminophen     Other     Sulfa Antibiotics Hives     Family Status   Relation Name Status    Mother      Father      Son  Alive       Review of Systems:  Constitutional: has fatigue, no fever, no recent weight gain, no recent weight loss, has changes in appetite  Eyes: no eye pain, has change in vision, has eye redness, has eye irritation, no double vision  Ears, nose, throat: no nasal congestion, no sore throat, no earache, no decrease in hearing, has hoarseness, no dry mouth, has sinus problems, has difficulty swallowing, no neck lumps, no dental problems, no mouth sores, has ringing in ears  Pulmonary: no shortness of breath, no wheezing, no cough  Cardiovascular: no chest pain, has lower extremity edema, no orthopnea, no intermittent leg claudication, no palpitations  Gastrointestinal: has abdominal pain, has nausea, no vomiting, has diarrhea, has constipation, has heartburn, has bloating  Genitourinary: has dysuria, no urinary incontinence, has urinary hesitancy, has change in urinary frequency, has feelings of urinary urgency, has nocturia  Musculoskeletal: has joint swelling, has joint stiffness, has joint pain, has muscle cramps, hsa muscle pain,has bone pain  Integument/Breast: has hair loss, noskin rashes, no skin lesions, has itching, has dry skin  Neurological: no numbness, has tingling, no weakness, no confusion, no headaches, has dizziness, no fainting, no tremors, no decrease in memory, no balance problems  Psychiatric: no anxiety, no depression, has insomnia  Hematologic/Lymphatic: no tendency for easy bleeding, no swollen lymph nodes, has tendency for easy bruising  Immunology: has seasonal allergies, no frequent infections, no frequent illnesses  Endocrine: has temperature intolerance, no hot flashes, no hand tremor    OBJECTIVE:   /68   Pulse 76   Temp 98 °F (36.7 °C)   Resp 14   Ht 5' 6\" (1.676 m)   Wt 114 lb (51.7 kg)   SpO2 98%   BMI 18.40 kg/m²   Wt Readings from Last 3 Encounters:   03/02/23 114 lb (51.7 kg)   10/31/22 109 lb (49.4 kg)   07/18/22 115 lb (52.2 kg)       Physical Exam:  Constitutional: no acute distress, well appearing, well nourished  Psychiatric: oriented to person, place and time, judgement, insight and normal, recent and remote memory and intact and mood, affect are normal  Skin: skin and subcutaneous tissue is normal without mass, normal turgor  Head and Face: examination of head and face revealed no abnormalities  Eyes: no lid or conjunctival swelling, no erythema or discharge, pupils are normal, equal, round, and reactive to light  Ears/Nose: external inspection of ears and nose revealed no abnormalities, hearing is grossly normal  Oropharynx/Mouth/Face: lips, tongue and gums are normal with no lesions, the voice quality was normal  Neck: neck is supple and symmetric, with midline trachea and no masses, thyroid is  small  Lymphatics: normal cervical lymph nodes, normal supraclavicular nodes  Pulmonary: no increased work of breathing or signs of respiratory distress, lungs are clear to auscultation  Cardiovascular: normal  heart rate and rhythm, normal S1 and S2, no murmurs and pedal pulses and 2+ bilaterally, No edema  Abdomen: abdomen is soft, non-tender with no masses  Musculoskeletal: normal gait and station, exam of the digits and nails are normal  Neurological: normal coordination, normal general cortical function    Lab Review:  Lab Results   Component Value Date/Time    TSH 0.734 03/01/2023 09:05 AM     No results found for: FREET4      ASSESSMENT/PLAN:  1. Acquired hypothyroidism  Dx with Colitis. Dx with Autonomic dysfunction  No gluten, dairy, fatty foods. Has gluten sensitivity  Has spot on the kidney, undergoing work up  Has Amber Danlos syndrome   Continue Synthroid 0.075 mg qd. Continue Liothyronine 2.5 mcg daily  TSH 1.7-0.63-1.35-1.17-0.88-0.631-0.189-1.03-0.89-0.531-0.734  T3 low, now normal on liothyronine  - T4, Free; Future  - TSH without Reflex; Future  - T3, Free; Future     2. Autonomic dysfunction  Repeated potassium 4.6.-3.8-4.0-5.0-4.1-4.7-4.5-4.6  Follow CMP. Now normal     3. IFG   Continue close monitoring  Dx with microscopic Colitis. Metformin 500 mg bid  Stopping Metformin caused sugar disbalance. Hemoglobin A1c 4.9-4.8-5.1-5.1-5.1  Glucose 92-86-59-66--91-88-93  Diet, exercise. No premature CAD in family  Has strong family history of diabetes    4.  Vitamin D deficiency  25-hydroxy vitamin D 31  - 25OHvitamin D  Vitamin D 2000 IU daily    Reviewed and/or ordered clinical lab results Yes   Reviewed and/or ordered radiology tests Yes   Reviewed and/or ordered other diagnostic tests No  Discussed test results with performing physician No  Independently reviewed image, tracing, or specimen No  Made a decision to obtain old records No  Reviewed old records Yes  Obtained history from other than patient No    Shabana Correa was counseled regarding symptoms of thyroid diagnosis, side effects of medications, treatment options,labs, imaging, treatment targets and goals, thyroid medications and heart, tachycardia, Cytomel. She understands instructions and counseling. Total visit time 30 min      Return in about 6 months (around 9/2/2023) for thyroid problems.

## 2023-04-04 ENCOUNTER — TELEPHONE (OUTPATIENT)
Dept: ENDOCRINOLOGY | Age: 53
End: 2023-04-04

## 2023-04-04 NOTE — TELEPHONE ENCOUNTER
Call from patient stating that [facility] informed her that Dr. Say Moss could fax over the pt's lab results and other documents supoorting her BW for T3 and her reactive hypoglycemia  She stated that she would send with her Patient# [id number]  Fax#  [phone]    Please advise

## 2023-05-01 ENCOUNTER — PATIENT MESSAGE (OUTPATIENT)
Dept: ENDOCRINOLOGY | Age: 53
End: 2023-05-01

## 2023-05-01 DIAGNOSIS — R73.01 IFG (IMPAIRED FASTING GLUCOSE): ICD-10-CM

## 2023-05-01 DIAGNOSIS — E03.9 ACQUIRED HYPOTHYROIDISM: ICD-10-CM

## 2023-05-02 RX ORDER — LEVOTHYROXINE SODIUM 75 MCG
TABLET ORAL
Qty: 90 TABLET | Refills: 1 | Status: SHIPPED | OUTPATIENT
Start: 2023-05-02

## 2023-05-02 RX ORDER — LIOTHYRONINE SODIUM 5 UG/1
5 TABLET ORAL DAILY
Qty: 90 TABLET | Refills: 1 | Status: SHIPPED | OUTPATIENT
Start: 2023-05-02 | End: 2024-05-01

## 2023-05-02 NOTE — TELEPHONE ENCOUNTER
From: Pura Brittle  To: Dr. Bill Hilton: 2023 5:09 PM EDT  Subject: Low t3    Hi Dr José Fernandez. I was thinking about my T3. We didnt have an issue with my T3 until the dose was changed a year ago after I had the echocardiograms. They all came back [place] and I was released by the cardiologist. His name is Dr. Kishor Moise at [place]. You can check my record to see but he thoroughly checked my heart and found nothing there. He prescribes nothing and there is no follow up required. I am wondering if the level would be back in the normal range and I would feel better if I was back on my previous dose. Im asking because my skin is so dry its cracking, my digestion has been a big issue with constipation being consistent if I dont have a flair of colitis, my cognition has been a real challenge, fatigue has been so bad and my  asked me if my thyroid was off at my last few visits. I know we had discussed [facility] and having multiple referrals. It looks like my neurologist referral got in first. So Im not sure they will get to the thyroid labs while Im there. Just wondering if we could go back to the dose that we know was working and see if that might help clear up some of these symptoms.  Thank you

## 2023-05-02 NOTE — TELEPHONE ENCOUNTER
Spoke with patient. Okay to increase liothyronine to 5 mcg 1 tablet daily. Advised to decrease Synthroid to 75 mcg 1 tablet 6 days a week and half tablet 1 day a week to compensate for increased liothyronine.

## 2023-08-18 LAB
ALBUMIN/GLOBULIN RATIO: 2.4 RATIO (ref 0.8–2.6)
ALBUMIN: 4.8 G/DL (ref 3.5–5.2)
ALP BLD-CCNC: 76 U/L (ref 23–144)
ALT SERPL-CCNC: 12 U/L (ref 0–60)
AST SERPL-CCNC: 14 U/L (ref 0–55)
BILIRUB SERPL-MCNC: 0.1 MG/DL (ref 0–1.2)
BUN BLDV-MCNC: 18 MG/DL (ref 3–29)
BUN/CREAT BLD: 23 (ref 7–25)
CALCIUM SERPL-MCNC: 9.6 MG/DL (ref 8.5–10.5)
CHLORIDE BLD-SCNC: 110 MEQ/L (ref 96–110)
CO2: 20 MEQ/L (ref 19–32)
CREAT SERPL-MCNC: 0.8 MG/DL (ref 0.5–1.2)
GLOBULIN: 2 G/DL (ref 1.9–3.6)
GLOMERULAR FILTRATION RATE: 89 MLS/MIN/1.73M2
GLUCOSE BLD-MCNC: 92 MG/DL (ref 70–99)
HBA1C MFR BLD: 4.9 % (ref 4–6)
POTASSIUM SERPL-SCNC: 4 MEQ/L (ref 3.4–5.3)
SODIUM BLD-SCNC: 142 MEQ/L (ref 135–148)
STATUS: NORMAL
T3 FREE: 1.7 PG/ML (ref 2.3–4.2)
T4 FREE: 1.03 NG/DL (ref 0.8–1.8)
TOTAL PROTEIN: 6.8 G/DL (ref 6–8.3)
TSH SERPL DL<=0.05 MIU/L-ACNC: 1.65 MCIU/ML (ref 0.4–4.5)
VITAMIN D 25-HYDROXY: 30 NG/ML (ref 30–100)

## 2023-09-07 ENCOUNTER — OFFICE VISIT (OUTPATIENT)
Dept: ENDOCRINOLOGY | Age: 53
End: 2023-09-07
Payer: COMMERCIAL

## 2023-09-07 VITALS
HEIGHT: 66 IN | WEIGHT: 112 LBS | DIASTOLIC BLOOD PRESSURE: 78 MMHG | RESPIRATION RATE: 14 BRPM | OXYGEN SATURATION: 99 % | SYSTOLIC BLOOD PRESSURE: 102 MMHG | HEART RATE: 88 BPM | BODY MASS INDEX: 18 KG/M2 | TEMPERATURE: 98 F

## 2023-09-07 DIAGNOSIS — E03.9 ACQUIRED HYPOTHYROIDISM: Primary | ICD-10-CM

## 2023-09-07 DIAGNOSIS — R73.01 IFG (IMPAIRED FASTING GLUCOSE): ICD-10-CM

## 2023-09-07 DIAGNOSIS — G90.9 AUTONOMIC DYSFUNCTION: ICD-10-CM

## 2023-09-07 DIAGNOSIS — E55.9 VITAMIN D DEFICIENCY DISEASE: ICD-10-CM

## 2023-09-07 PROCEDURE — 3017F COLORECTAL CA SCREEN DOC REV: CPT | Performed by: INTERNAL MEDICINE

## 2023-09-07 PROCEDURE — G8419 CALC BMI OUT NRM PARAM NOF/U: HCPCS | Performed by: INTERNAL MEDICINE

## 2023-09-07 PROCEDURE — G8427 DOCREV CUR MEDS BY ELIG CLIN: HCPCS | Performed by: INTERNAL MEDICINE

## 2023-09-07 PROCEDURE — 1036F TOBACCO NON-USER: CPT | Performed by: INTERNAL MEDICINE

## 2023-09-07 PROCEDURE — 99214 OFFICE O/P EST MOD 30 MIN: CPT | Performed by: INTERNAL MEDICINE

## 2023-09-07 RX ORDER — LEVOTHYROXINE SODIUM 75 MCG
75 TABLET ORAL
Qty: 90 TABLET | Refills: 1 | Status: CANCELLED | OUTPATIENT
Start: 2023-09-07

## 2023-09-07 RX ORDER — LEVOTHYROXINE SODIUM 75 MCG
75 TABLET ORAL
Qty: 30 TABLET | Refills: 3 | Status: SHIPPED | OUTPATIENT
Start: 2023-09-07

## 2023-09-07 RX ORDER — ESTRADIOL 0.1 MG/G
2 CREAM VAGINAL NIGHTLY
COMMUNITY

## 2023-09-07 RX ORDER — ASPIRIN 81 MG/1
1 TABLET ORAL DAILY
COMMUNITY
Start: 2021-05-17

## 2023-09-07 RX ORDER — LEVOTHYROXINE SODIUM 0.07 MG/1
75 TABLET ORAL DAILY
COMMUNITY
End: 2023-09-07

## 2023-09-07 RX ORDER — BACILLUS COAGULANS/INULIN 1B-250 MG
1 CAPSULE ORAL DAILY
COMMUNITY
Start: 2017-05-17

## 2023-09-07 RX ORDER — LEVOTHYROXINE SODIUM 75 UG/1
75 CAPSULE ORAL DAILY
Qty: 90 CAPSULE | Refills: 1 | Status: SHIPPED | OUTPATIENT
Start: 2023-09-07

## 2023-09-07 NOTE — PROGRESS NOTES
1.7-0.63-1.35-1.17-0.88-0.631-0.189-1.03-0.89-0.531-0.734-1.65  - T4, Free; Future  - TSH without Reflex; Future  - T3, Free; Future     2. Autonomic dysfunction  Going to [facility] for cardiovascular evaluation  Repeated potassium 4.6.-3.8-4.0-5.0-4.1-4.7-4.5-4.6  Follow CMP. Now normal     3. IFG   Continue close monitoring  Dx with microscopic Colitis. Metformin 500 mg bid  Stopping Metformin caused sugar disbalance. Hemoglobin A1c 4.9-4.8-5.1-5.1-5.1-4.9  Glucose 39-02-57-81--91-88-93-92  Diet, exercise. No premature CAD in family  Has strong family history of diabetes    4. Vitamin D deficiency  25-hydroxy vitamin D 31-30  - 25OHvitamin D  Vitamin D3- 2000 IU daily, changed recently    Reviewed and/or ordered clinical lab results Yes   Reviewed and/or ordered radiology tests Yes   Reviewed and/or ordered other diagnostic tests No  Discussed test results with performing physician No  Independently reviewed image, tracing, or specimen No  Made a decision to obtain old records No  Reviewed old records Yes  Obtained history from other than patient No    Roman Gooden was counseled regarding symptoms of thyroid diagnosis, side effects of medications, treatment options,labs, imaging, treatment targets and goals, thyroid medications and heart, tachycardia, Cytomel. She understands instructions and counseling. Total visit time 30 min      Return in about 6 months (around 3/7/2024) for thyroid problems.

## 2023-09-11 ENCOUNTER — TELEPHONE (OUTPATIENT)
Dept: ENDOCRINOLOGY | Age: 53
End: 2023-09-11

## 2023-11-08 ENCOUNTER — PATIENT MESSAGE (OUTPATIENT)
Dept: ENDOCRINOLOGY | Age: 53
End: 2023-11-08

## 2023-11-08 RX ORDER — LEVOTHYROXINE SODIUM 75 MCG
75 TABLET ORAL
Qty: 90 TABLET | Refills: 1 | Status: SHIPPED | OUTPATIENT
Start: 2023-11-08

## 2023-11-08 NOTE — TELEPHONE ENCOUNTER
I spoke with patient. I advised her to see Dr. Bud Cheung, who specializes in endocrinology and functional medicine, also gut health might be helpful with concerns regarding absorption issues. Also advised to resume Synthroid 75 mcg daily and sent prescription to pharmacy.

## 2023-12-08 LAB
T3 FREE: 2.3 PG/ML (ref 2.3–4.2)
T4 FREE: 1.12 NG/DL (ref 0.8–1.8)
TSH SERPL DL<=0.05 MIU/L-ACNC: 2.12 MCIU/ML (ref 0.4–4.5)

## 2023-12-13 ENCOUNTER — TELEPHONE (OUTPATIENT)
Dept: ENDOCRINOLOGY | Age: 53
End: 2023-12-13

## 2023-12-13 NOTE — TELEPHONE ENCOUNTER
Please inform patient that I reviewed lab results on the thyroid test is good. Continue same Synthroid 0.075 mg daily.

## 2025-03-04 ENCOUNTER — OFFICE VISIT (OUTPATIENT)
Dept: ENDOCRINOLOGY | Age: 55
End: 2025-03-04
Payer: COMMERCIAL

## 2025-03-04 VITALS
SYSTOLIC BLOOD PRESSURE: 97 MMHG | HEART RATE: 81 BPM | TEMPERATURE: 98 F | DIASTOLIC BLOOD PRESSURE: 70 MMHG | WEIGHT: 116 LBS | OXYGEN SATURATION: 97 % | HEIGHT: 66 IN | BODY MASS INDEX: 18.64 KG/M2 | RESPIRATION RATE: 14 BRPM

## 2025-03-04 DIAGNOSIS — R73.01 IFG (IMPAIRED FASTING GLUCOSE): ICD-10-CM

## 2025-03-04 DIAGNOSIS — G90.9 AUTONOMIC DYSFUNCTION: ICD-10-CM

## 2025-03-04 DIAGNOSIS — E55.9 VITAMIN D DEFICIENCY DISEASE: ICD-10-CM

## 2025-03-04 DIAGNOSIS — E03.9 ACQUIRED HYPOTHYROIDISM: Primary | ICD-10-CM

## 2025-03-04 PROCEDURE — G8420 CALC BMI NORM PARAMETERS: HCPCS | Performed by: INTERNAL MEDICINE

## 2025-03-04 PROCEDURE — 1036F TOBACCO NON-USER: CPT | Performed by: INTERNAL MEDICINE

## 2025-03-04 PROCEDURE — 99214 OFFICE O/P EST MOD 30 MIN: CPT | Performed by: INTERNAL MEDICINE

## 2025-03-04 PROCEDURE — 3017F COLORECTAL CA SCREEN DOC REV: CPT | Performed by: INTERNAL MEDICINE

## 2025-03-04 PROCEDURE — G8427 DOCREV CUR MEDS BY ELIG CLIN: HCPCS | Performed by: INTERNAL MEDICINE

## 2025-03-04 RX ORDER — LEVOTHYROXINE SODIUM 88 MCG
88 TABLET ORAL
Qty: 30 TABLET | Refills: 3 | Status: SHIPPED | OUTPATIENT
Start: 2025-03-04

## 2025-03-04 RX ORDER — CHOLECALCIFEROL (VITAMIN D3) 25 MCG
2000 TABLET ORAL DAILY
Qty: 30 TABLET | Refills: 0
Start: 2025-03-04

## 2025-03-04 RX ORDER — LEVOTHYROXINE SODIUM 75 MCG
75 TABLET ORAL
Qty: 90 TABLET | Refills: 1 | Status: CANCELLED | OUTPATIENT
Start: 2025-03-04

## 2025-03-04 NOTE — PROGRESS NOTES
SUBJECTIVE:  Pati Pace is a 55 y.o. female who is here for hypothyroidism.     1. Acquired hypothyroidism     This started in 2008. Patient was diagnosed with hypothyroidism. The problem has been unchanged.   Patient started medication in 2008. Currently patient is on: Synthroid.  Misses  0 doses a month.  Tried levothyroxine for 3 months, had heat feeling.     Current complaints: fatigue, dry skin, dry eyes.  Fatigue is worse.  Hair loss worse.  Has insomnia. Feels like has not slept.  Tachycardia for several months.  Takes Midodrine.  Has heart defect, monitored.    History of obstructive symptoms: difficulty swallowing Yes, changes in voice/hoarseness Yes.  Had 7 cervical fusions. Has pain.     History of radiation to patient's neck: No  Resent iodine exposure: No  Family history includes hypothyroidism.  Family history of thyroid cancer: No     2. Autonomic dysfunction  Has palpitations, CP, SOB when exercises.  Feels very bad, severe symptoms.  Has autonomic dysfunction  Sees Cardiologist.  Stopping spironolactone did not help. Now is back.  Midodrine does not help with exercise.  Stopping Cytomel did not help.    3. IFG   Glucose [phone]  Stopped Metformin, fluctuating levels of BG when off metformin.  Back on Metformin 500 mg bid.    4. Vitamin D deficiency  Has fatigue    Result Impression   IMPRESSION:    Small thyroid gland without focal lesion.  Workstation ID:[id number]   Result Narrative     THYROID ULTRASOUND: 5/17/2018    INDICATION: Hypothyroidism    COMPARISON:  No direct comparison, reference to CT neck July 2015    TECHNIQUE: Ultrasonographic evaluation of the thyroid    FINDINGS:      RIGHT LOBE: 2.9 x 0.8 x 1 cm.  Homogenous echogenicity without discrete nodule.    LEFT LOBE:   2.8 x 0.6 x 1 cm.   Homogenous echogenicity without discrete nodule.    Isthmus: Unremarkable.    Cervical lymph nodes: None identified.   Status            IMPRESSION:    Postoperative findings of C3-C7 fusion with